# Patient Record
Sex: MALE | Race: WHITE | NOT HISPANIC OR LATINO | URBAN - METROPOLITAN AREA
[De-identification: names, ages, dates, MRNs, and addresses within clinical notes are randomized per-mention and may not be internally consistent; named-entity substitution may affect disease eponyms.]

---

## 2017-05-21 ENCOUNTER — EMERGENCY (EMERGENCY)
Facility: HOSPITAL | Age: 67
LOS: 0 days | Discharge: HOME | End: 2017-05-21
Admitting: INTERNAL MEDICINE

## 2017-06-28 DIAGNOSIS — S81.852A OPEN BITE, LEFT LOWER LEG, INITIAL ENCOUNTER: ICD-10-CM

## 2017-06-28 DIAGNOSIS — Z23 ENCOUNTER FOR IMMUNIZATION: ICD-10-CM
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
## 2021-09-19 ENCOUNTER — TRANSCRIPTION ENCOUNTER (OUTPATIENT)
Age: 71
End: 2021-09-19

## 2022-04-07 ENCOUNTER — TRANSCRIPTION ENCOUNTER (OUTPATIENT)
Age: 72
End: 2022-04-07

## 2022-04-14 ENCOUNTER — INPATIENT (INPATIENT)
Facility: HOSPITAL | Age: 72
LOS: 3 days | Discharge: HOME | End: 2022-04-18
Attending: PSYCHIATRY & NEUROLOGY | Admitting: PSYCHIATRY & NEUROLOGY
Payer: MEDICARE

## 2022-04-14 VITALS — WEIGHT: 274.26 LBS

## 2022-04-14 LAB
ALBUMIN SERPL ELPH-MCNC: 4.2 G/DL — SIGNIFICANT CHANGE UP (ref 3.5–5.2)
ALP SERPL-CCNC: 74 U/L — SIGNIFICANT CHANGE UP (ref 30–115)
ALT FLD-CCNC: 33 U/L — SIGNIFICANT CHANGE UP (ref 0–41)
ANION GAP SERPL CALC-SCNC: 10 MMOL/L — SIGNIFICANT CHANGE UP (ref 7–14)
APTT BLD: 33.3 SEC — SIGNIFICANT CHANGE UP (ref 27–39.2)
AST SERPL-CCNC: 29 U/L — SIGNIFICANT CHANGE UP (ref 0–41)
BASOPHILS # BLD AUTO: 0.02 K/UL — SIGNIFICANT CHANGE UP (ref 0–0.2)
BASOPHILS NFR BLD AUTO: 0.2 % — SIGNIFICANT CHANGE UP (ref 0–1)
BILIRUB SERPL-MCNC: 0.6 MG/DL — SIGNIFICANT CHANGE UP (ref 0.2–1.2)
BLD GP AB SCN SERPL QL: SIGNIFICANT CHANGE UP
BUN SERPL-MCNC: 18 MG/DL — SIGNIFICANT CHANGE UP (ref 10–20)
CALCIUM SERPL-MCNC: 9.1 MG/DL — SIGNIFICANT CHANGE UP (ref 8.5–10.1)
CHLORIDE SERPL-SCNC: 100 MMOL/L — SIGNIFICANT CHANGE UP (ref 98–110)
CO2 SERPL-SCNC: 29 MMOL/L — SIGNIFICANT CHANGE UP (ref 17–32)
CREAT SERPL-MCNC: 0.8 MG/DL — SIGNIFICANT CHANGE UP (ref 0.7–1.5)
EGFR: 96 ML/MIN/1.73M2 — SIGNIFICANT CHANGE UP
EOSINOPHIL # BLD AUTO: 0.1 K/UL — SIGNIFICANT CHANGE UP (ref 0–0.7)
EOSINOPHIL NFR BLD AUTO: 0.9 % — SIGNIFICANT CHANGE UP (ref 0–8)
GLUCOSE SERPL-MCNC: 92 MG/DL — SIGNIFICANT CHANGE UP (ref 70–99)
HCT VFR BLD CALC: 46 % — SIGNIFICANT CHANGE UP (ref 42–52)
HGB BLD-MCNC: 15.7 G/DL — SIGNIFICANT CHANGE UP (ref 14–18)
IMM GRANULOCYTES NFR BLD AUTO: 0.7 % — HIGH (ref 0.1–0.3)
INR BLD: 1.11 RATIO — SIGNIFICANT CHANGE UP (ref 0.65–1.3)
LYMPHOCYTES # BLD AUTO: 18.5 % — LOW (ref 20.5–51.1)
LYMPHOCYTES # BLD AUTO: 2.13 K/UL — SIGNIFICANT CHANGE UP (ref 1.2–3.4)
MCHC RBC-ENTMCNC: 31.2 PG — HIGH (ref 27–31)
MCHC RBC-ENTMCNC: 34.1 G/DL — SIGNIFICANT CHANGE UP (ref 32–37)
MCV RBC AUTO: 91.3 FL — SIGNIFICANT CHANGE UP (ref 80–94)
MONOCYTES # BLD AUTO: 0.94 K/UL — HIGH (ref 0.1–0.6)
MONOCYTES NFR BLD AUTO: 8.1 % — SIGNIFICANT CHANGE UP (ref 1.7–9.3)
NEUTROPHILS # BLD AUTO: 8.27 K/UL — HIGH (ref 1.4–6.5)
NEUTROPHILS NFR BLD AUTO: 71.6 % — SIGNIFICANT CHANGE UP (ref 42.2–75.2)
NRBC # BLD: 0 /100 WBCS — SIGNIFICANT CHANGE UP (ref 0–0)
PLATELET # BLD AUTO: 223 K/UL — SIGNIFICANT CHANGE UP (ref 130–400)
POTASSIUM SERPL-MCNC: 4.2 MMOL/L — SIGNIFICANT CHANGE UP (ref 3.5–5)
POTASSIUM SERPL-SCNC: 4.2 MMOL/L — SIGNIFICANT CHANGE UP (ref 3.5–5)
PROT SERPL-MCNC: 6.8 G/DL — SIGNIFICANT CHANGE UP (ref 6–8)
PROTHROM AB SERPL-ACNC: 12.7 SEC — SIGNIFICANT CHANGE UP (ref 9.95–12.87)
RBC # BLD: 5.04 M/UL — SIGNIFICANT CHANGE UP (ref 4.7–6.1)
RBC # FLD: 13.3 % — SIGNIFICANT CHANGE UP (ref 11.5–14.5)
SARS-COV-2 RNA SPEC QL NAA+PROBE: SIGNIFICANT CHANGE UP
SODIUM SERPL-SCNC: 139 MMOL/L — SIGNIFICANT CHANGE UP (ref 135–146)
TROPONIN T SERPL-MCNC: <0.01 NG/ML — SIGNIFICANT CHANGE UP
WBC # BLD: 11.54 K/UL — HIGH (ref 4.8–10.8)
WBC # FLD AUTO: 11.54 K/UL — HIGH (ref 4.8–10.8)

## 2022-04-14 PROCEDURE — 61645 PERQ ART M-THROMBECT &/NFS: CPT | Mod: LT

## 2022-04-14 PROCEDURE — 99291 CRITICAL CARE FIRST HOUR: CPT

## 2022-04-14 PROCEDURE — 93970 EXTREMITY STUDY: CPT | Mod: 26

## 2022-04-14 PROCEDURE — 70496 CT ANGIOGRAPHY HEAD: CPT | Mod: 26,MA

## 2022-04-14 PROCEDURE — 70450 CT HEAD/BRAIN W/O DYE: CPT | Mod: 26

## 2022-04-14 PROCEDURE — 70498 CT ANGIOGRAPHY NECK: CPT | Mod: 26,MA

## 2022-04-14 PROCEDURE — 0042T: CPT

## 2022-04-14 PROCEDURE — 93010 ELECTROCARDIOGRAM REPORT: CPT

## 2022-04-14 RX ORDER — HEPARIN SODIUM 5000 [USP'U]/ML
5000 INJECTION INTRAVENOUS; SUBCUTANEOUS EVERY 8 HOURS
Refills: 0 | Status: DISCONTINUED | OUTPATIENT
Start: 2022-04-14 | End: 2022-04-15

## 2022-04-14 RX ORDER — SODIUM CHLORIDE 9 MG/ML
1000 INJECTION, SOLUTION INTRAVENOUS
Refills: 0 | Status: DISCONTINUED | OUTPATIENT
Start: 2022-04-14 | End: 2022-04-14

## 2022-04-14 RX ORDER — CHLORHEXIDINE GLUCONATE 213 G/1000ML
1 SOLUTION TOPICAL
Refills: 0 | Status: DISCONTINUED | OUTPATIENT
Start: 2022-04-14 | End: 2022-04-18

## 2022-04-14 RX ORDER — BUDESONIDE AND FORMOTEROL FUMARATE DIHYDRATE 160; 4.5 UG/1; UG/1
2 AEROSOL RESPIRATORY (INHALATION)
Refills: 0 | Status: DISCONTINUED | OUTPATIENT
Start: 2022-04-14 | End: 2022-04-18

## 2022-04-14 RX ORDER — ASPIRIN/CALCIUM CARB/MAGNESIUM 324 MG
81 TABLET ORAL DAILY
Refills: 0 | Status: DISCONTINUED | OUTPATIENT
Start: 2022-04-14 | End: 2022-04-14

## 2022-04-14 RX ORDER — ATORVASTATIN CALCIUM 80 MG/1
80 TABLET, FILM COATED ORAL AT BEDTIME
Refills: 0 | Status: DISCONTINUED | OUTPATIENT
Start: 2022-04-14 | End: 2022-04-18

## 2022-04-14 RX ORDER — IPRATROPIUM/ALBUTEROL SULFATE 18-103MCG
3 AEROSOL WITH ADAPTER (GRAM) INHALATION EVERY 6 HOURS
Refills: 0 | Status: DISCONTINUED | OUTPATIENT
Start: 2022-04-14 | End: 2022-04-18

## 2022-04-14 RX ORDER — ASPIRIN/CALCIUM CARB/MAGNESIUM 324 MG
300 TABLET ORAL DAILY
Refills: 0 | Status: DISCONTINUED | OUTPATIENT
Start: 2022-04-14 | End: 2022-04-15

## 2022-04-14 RX ORDER — SODIUM CHLORIDE 9 MG/ML
1000 INJECTION, SOLUTION INTRAVENOUS
Refills: 0 | Status: DISCONTINUED | OUTPATIENT
Start: 2022-04-14 | End: 2022-04-15

## 2022-04-14 RX ORDER — ACETAMINOPHEN 500 MG
1000 TABLET ORAL ONCE
Refills: 0 | Status: COMPLETED | OUTPATIENT
Start: 2022-04-14 | End: 2022-04-14

## 2022-04-14 RX ORDER — SODIUM CHLORIDE 9 MG/ML
1000 INJECTION INTRAMUSCULAR; INTRAVENOUS; SUBCUTANEOUS
Refills: 0 | Status: DISCONTINUED | OUTPATIENT
Start: 2022-04-14 | End: 2022-04-18

## 2022-04-14 RX ADMIN — Medication 40 MILLIGRAM(S): at 22:18

## 2022-04-14 RX ADMIN — BUDESONIDE AND FORMOTEROL FUMARATE DIHYDRATE 2 PUFF(S): 160; 4.5 AEROSOL RESPIRATORY (INHALATION) at 23:14

## 2022-04-14 RX ADMIN — SODIUM CHLORIDE 75 MILLILITER(S): 9 INJECTION INTRAMUSCULAR; INTRAVENOUS; SUBCUTANEOUS at 22:14

## 2022-04-14 RX ADMIN — HEPARIN SODIUM 5000 UNIT(S): 5000 INJECTION INTRAVENOUS; SUBCUTANEOUS at 22:19

## 2022-04-14 RX ADMIN — Medication 3 MILLILITER(S): at 22:02

## 2022-04-14 RX ADMIN — CHLORHEXIDINE GLUCONATE 1 APPLICATION(S): 213 SOLUTION TOPICAL at 22:20

## 2022-04-14 RX ADMIN — SODIUM CHLORIDE 1000 MILLILITER(S): 9 INJECTION, SOLUTION INTRAVENOUS at 22:44

## 2022-04-14 NOTE — H&P ADULT - ASSESSMENT
70 y/o male with COPD, HTN and VICKI on CPAP was brought for evaluation of confusion,    #CVA with Left MCA infarction s/p thrombectomy  - neurocheck q1h, NICU monitor, bedrest, check echo, EKG, lipid panel, A1c   - heparin sq, repeat CTh stable, will do ASA and Lipitor  - repeat CTH in AM  - Neuroendovascular f/u  - SBP goal 180-140    #HTN: - SBP goal 180-140    #COPD:  - symbicort and albuterol  - one dose of methylprednisone     #VICKI: on CPAP at night  - hold CPAP given RS secretion    ##Diet:NPO, speech eval  #DVT pro: heparin sq  #GI pro: not indicated for now  #Dispo: Neuro CU     70 y/o male with COPD, HTN and VICKI on CPAP was brought for evaluation of confusion,    #CVA with Left MCA infarction s/p thrombectomy  - neurocheck q1h, NICU monitor, bedrest, check echo, EKG, lipid panel, A1c   - heparin sq, repeat CTh stable, will do ASA and Lipitor  - repeat CTH in AM  - Neuroendovascular f/u  - SBP goal 180-140  - given distal DVT will check bubble study for possible PFO    #HTN: - SBP goal 180-140    #distal DVT: left post tibial  - heparin sq for now,     #COPD:  - symbicort and albuterol  - one dose of methylprednisone     #VICKI: on CPAP at night  - hold CPAP given RS secretion    ##Diet:NPO, speech eval  #DVT pro: heparin sq  #GI pro: not indicated for now  #Dispo: Neuro CU     70 y/o male with COPD, HTN and VICKI on CPAP was brought for evaluation of confusion,    #CVA with Left MCA infarction s/p thrombectomy  - neurocheck q1h, NICU monitor, bedrest, check echo, EKG, lipid panel, A1c   - heparin sq, repeat CTh stable, will do ASA and Lipitor  - repeat CTH in AM  - Neuroendovascular f/u  - SBP goal 180-140  - given distal DVT will check bubble study for possible PFO    #HTN: - SBP goal 180-140    #distal DVT: Right post tibial  - heparin sq for now,     #COPD:  - symbicort and albuterol  - one dose of methylprednisone     #VICKI: on CPAP at night  - hold CPAP given RS secretion    ##Diet:NPO, speech eval  #DVT pro: heparin sq  #GI pro: not indicated for now  #Dispo: Neuro CU

## 2022-04-14 NOTE — H&P ADULT - HISTORY OF PRESENT ILLNESS
70 y/o male with COPD, HTN and VICKI on CPAP was brought for evaluation of confusion,    the pt was doing well today, he dropped his GF to her work, later after he arrived home he felt headache, took motrin with no improvement,   at 3pm, he drove and  his GF from the wrok and told her he has headache and not feeling well, after 30 min he was not able  to remember things including his GF name, then he had truobkle finding words and was making non comprehnsive talk,     pt not on antiplatelets or blood thinner, no hx of fever, previous CVA, or CAD, no cough, CP, recent travel, urianry symptoms, seizure or syncope.  in ER [facility], stroke code called for confusion, aphasia and dysarthria, CTH negative but CT Angio +ve for Abrupt occlusion in the inferior terminal branch of the left M2 MCA  and CTP +ve for Perfusion imaging predicts a core infarct of 7 mL in the left temporal   lobe within the left MCA territory.  pt was out of window for TPA, but sent to [place] for thrombectomy, s/p  left MCA M2 branch, per NE there's distal clots at M3 not able to be reached, pt was intubated during procedure  and successfully extubated, currently pt follows simple commands, more awake and alert, still have mild dysarthria and aphasia

## 2022-04-14 NOTE — ED ADULT NURSE REASSESSMENT NOTE - NS ED NURSE REASSESS COMMENT FT1
patient unable to complete dysphagia screen at this time as EMS here to transfer patient to [facility]

## 2022-04-14 NOTE — ED PROVIDER NOTE - CRITICAL CARE ATTENDING CONTRIBUTION TO CARE
71-year-old male with a past medical history significant for VICKI hypertension who presents with confusion.  As per girlfriend patient drove her to work approximately 10 AM and was acting like his usual self.  Patient did complain of a headache to girlfriend but had no other symptoms.  When girlfriend found patient at home at approximately 330 patient was more confused and aphasic.  Patient presented to the ED was called as a stroke notification.  And also has an N I alert and actual.  Spoke to Dr. SITA Pierre stroke attending, and Dr. Thayer, ED attending.  Both informed of the left MCA occlusion.  Patient transferred to the [facility] for neuro intervention.  Family and patient aware. NIH score 5.     VITAL SIGNS: I have reviewed nursing notes and confirm.  CONSTITUTIONAL: non-toxic, well appearing  SKIN: no rash, no petechiae.  EYES: EOMI, pink conjunctiva, anicteric  ENT: tongue midline, no exudates, MMM  NECK: Supple; no meningismus, no JVD  CARD: RRR, no murmurs, equal radial pulses bilaterally 2+  RESP: CTAB, no respiratory distress  ABD: Soft, non-tender, non-distended, no peritoneal signs, no HSM, no CVA tenderness  EXT: Normal ROM x4. No edema. No calves tenderness  NEURO: Alert, oriented x1 (person), pt aphasic. not following all commands.     a/p  71-year-old male who presents for altered mental status called as a stroke code, and I actually know an NI alert.  Telestroke, neuro intervention, ED attending all aware of case.  Patient to go to [place] for evaluation.  Patient also going for neuro intervention.

## 2022-04-14 NOTE — ED ADULT NURSE NOTE - NSINTERVENTIONOPT_GEN_ALL_ED
[FreeTextEntry1] : 73-year-old man originally from [facility] who had 2 stents placed about 3 years ago. Stretcher Alarms/[place]our[place] Rounding

## 2022-04-14 NOTE — ED PROVIDER NOTE - OBJECTIVE STATEMENT
70 y/o male with hx of VICKI, HTN presents to the ED for evaluation of confusion . As per girlfriend, patient drove her to work this morning at 10am , went shopping to [place] and then developed a headache for which he took motrin . patient developed confusion and aphasia. patient able to ambulate. patient not on anticoagulation

## 2022-04-14 NOTE — ED ADULT NURSE REASSESSMENT NOTE - NS ED NURSE REASSESS COMMENT FT1
EMS transport here to transfer patient to [place] ED, patient currently leaving for [place] ED at this time EMS transport here to transfer patient to [place] ED, patient currently leaving for [place] ED at this time, report given to charge Noemi Thao in ED

## 2022-04-14 NOTE — PATIENT PROFILE ADULT - FALL HARM RISK - HARM RISK INTERVENTIONS
Assistance with ambulation/Assistance OOB with selected safe patient handling equipment/Communicate Risk of Fall with Harm to all staff/Discuss with provider need for PT consult/Monitor gait and stability/Provide patient with walking aids - walker, cane, crutches/Reinforce activity limits and safety measures with patient and family/Sit up slowly, dangle for a short time, stand at bedside before walking/Tailored Fall Risk Interventions/Use of alarms - bed, chair and/or voice tab/Visual Cue: Yellow wristband and red socks/Bed in lowest position, wheels locked, appropriate side rails in place/Call bell, personal items and telephone in reach/Instruct patient to call for assistance before getting out of bed or chair/Non-slip footwear when patient is out of bed/[place] to call system/Physically safe environment - no spills, clutter or unnecessary equipment/Purposeful Proactive Rounding/Room/bathroom lighting operational, light cord in reach

## 2022-04-14 NOTE — H&P ADULT - NSHPPHYSICALEXAM_GEN_ALL_CORE
CONSTITUTIONAL: No acute distress, well-developed, well-groomed, AAOx3  HEAD: Atraumatic, normocephalic  EYES: EOM intact, PERRLA, conjunctiva and sclera clear  ENT: pt has secretions, Supple, no masses, no thyromegaly, no bruits, no JVD; moist mucous membranes  PULMONARY: Clear to auscultation bilaterally; no wheezes, rales, or rhonchi  CARDIOVASCULAR: Regular rate and rhythm; no murmurs, rubs, or gallops  GASTROINTESTINAL: Soft, non-tender, non-distended; bowel sounds present, right groin access, no bleeding  MUSCULOSKELETAL: 2+ peripheral pulses; no clubbing, no cyanosis, no edema  NEUROLOGY: alert, awake, oriented x3, knows his name, can tell we are in [place] and tells his , moves all ext, sensation itnact in all ext, but has mild aphasia and dysarthria,   SKIN: No rashes or lesions; warm and dry

## 2022-04-14 NOTE — ED PROVIDER NOTE - CLINICAL SUMMARY MEDICAL DECISION MAKING FREE TEXT BOX
Patient received as emergent transfer from [facility] for stroke with large vessel occlusion.  Full work-up reviewed including CT angio.  Case discussed with interventional radiology, neurology, neuro critical care.  Vital signs reviewed.  Dr. Stoddard spoken to in IR suite pending patient arrival.  Will admit to neuro critical care via IR suite for clot retrieval.

## 2022-04-14 NOTE — CHART NOTE - NSCHARTNOTEFT_GEN_A_CORE
PACU ANESTHESIA ADMISSION NOTE      Procedure:   Post op diagnosis:      ____  Intubated  TV:______       Rate: ______      FiO2: ______    _x___  Patent Airway    __x__  Full return of protective reflexes    __x__  Full recovery from anesthesia / back to baseline     Vitals:   T:  36         R: 20                 BP:    140/70              Sat: 97                 P: 52      Mental Status:  __x__ Awake   _____ Alert   _____ Drowsy   _____ Sedated    Nausea/Vomiting:  __x__ NO  ______Yes,   See Post - Op Orders          Pain Scale (0-10):  ___0__    Treatment: ____ None    ____ See Post - Op/PCA Orders    Post - Operative Fluids:   ____ Oral   ____ See Post - Op Orders    Plan: Discharge:   ____Home       _____Floor     __x___Critical Care    _____  Other:_________________    Comments:

## 2022-04-14 NOTE — BRIEF OPERATIVE NOTE - OPERATION/FINDINGS
Procedure : Diagnostic cerebral angiogram     Amount and type of contrast : Visipaque 320   Medications given during procedure: n/a  Implants placed: n/a  Complications: n/a     Post-procedure exam:   NIHSS 5  Extremity RLE groin site CDI without evidence of hematoma formation, swelling, warmth, oozing at the time of closure. Distal pulses intact to palpation post procedure.   Abd NTND     Suggestions :   Patient is to be admitted to neuro ICU post procedure,   Please follow post NI orders for neuro checks, distal pulses, vitals, and groin checks placed for total of 24 hour recovery period following procedure. Please keep reverse trendelenberg, knee immobilizer, bed rest x 6 hr.   SBP <160.    Please notify provider with any signs of bleeding or hematoma at R groin site, change in mental status, vitals outside parameters, or absent distal pulses.   Management per neuro ICU.   [phone]

## 2022-04-14 NOTE — CHART NOTE - NSCHARTNOTEFT_GEN_A_CORE
Patient presented to [facility] 10:00 4/14/22 presenting with confusion and trouble speaking, stroke code activated, NIHSS 5 for aphasia, dysarthria, LOC questions. CTH with age indeterminant infarct in right hemisphere, CTP with 7ml core infarct, 1.9 mismatch volume in the L MCA territory. Code NI actual activated patient to be transferred to [facility] for neuroendovascular mechanical thrombectomy.     Patient's brother's number in chart out of service attempting to contact patient's girlfriend for further appropriate relations to provide consent, if none available 2 PC will be obtained due to emergent nature of procedure.     Dr. Castellon notified.

## 2022-04-14 NOTE — H&P ADULT - NSHPLABSRESULTS_GEN_ALL_CORE
LABS:                        15.7   11.54 )-----------( 223      ( 14 Apr 2022 17:15 )             46.0     14 Apr 2022 17:15    139    |  100    |  18     ----------------------------<  92     4.2     |  29     |  0.8      Ca    9.1        14 Apr 2022 17:15    TPro  6.8    /  Alb  4.2    /  TBili  0.6    /  DBili  x      /  AST  29     /  ALT  33     /  AlkPhos  74     14 Apr 2022 17:15    PT/INR - ( 14 Apr 2022 17:15 )   PT: 12.70 sec;   INR: 1.11 ratio         PTT - ( 14 Apr 2022 17:15 )  PTT:33.3 sec  < from: CT Angio Neck w/ IV Cont (04.14.22 @ 17:17) >    IMPRESSION:    CT PERFUSION:  Perfusion imaging predicts a core infarct of 7 mL in the left temporal   lobe within the left MCA territory. Based on the perfusion mismatch,   there is a predicted volume of 30 mL penumbra of tissue at risk. Mismatch   ratio of 5.3.    CTA HEAD/NECK:  Abrupt occlusion in the inferior terminal branch of the left M2 MCA. The   remaining MCA branches are patent.    Communication: The summary of above findings were discussed with readback   confirmation with ANIA Thorne by radiologist Dr. Zuniga on 4/14/2022 at 5:33   PM.    --- End of Report ---    < end of copied text >    < from: CT Head No Cont (04.14.22 @ 20:39) >      IMPRESSION:    Status post thrombectomy of left M2 MCA occlusion. No detectable acute   territorial infarct, intracranial hemorrhage, or midline shift.    --- End of Report ---

## 2022-04-14 NOTE — ED PROVIDER NOTE - PROGRESS NOTE DETAILS
stroke code immediately called. patient to CT. spoke with Dr. Teague who advises patient as NI and transferrred to [facility]. telestroke eval patient. patient family aware of situation and transferred to [facility]

## 2022-04-15 LAB
ALBUMIN SERPL ELPH-MCNC: 3.4 G/DL — LOW (ref 3.5–5.2)
ALBUMIN SERPL ELPH-MCNC: 3.4 G/DL — LOW (ref 3.5–5.2)
ALP SERPL-CCNC: 65 U/L — SIGNIFICANT CHANGE UP (ref 30–115)
ALP SERPL-CCNC: 68 U/L — SIGNIFICANT CHANGE UP (ref 30–115)
ALT FLD-CCNC: 28 U/L — SIGNIFICANT CHANGE UP (ref 0–41)
ALT FLD-CCNC: 28 U/L — SIGNIFICANT CHANGE UP (ref 0–41)
ANION GAP SERPL CALC-SCNC: 10 MMOL/L — SIGNIFICANT CHANGE UP (ref 7–14)
ANION GAP SERPL CALC-SCNC: 11 MMOL/L — SIGNIFICANT CHANGE UP (ref 7–14)
APTT BLD: 28.2 SEC — SIGNIFICANT CHANGE UP (ref 27–39.2)
AST SERPL-CCNC: 21 U/L — SIGNIFICANT CHANGE UP (ref 0–41)
AST SERPL-CCNC: 25 U/L — SIGNIFICANT CHANGE UP (ref 0–41)
BASOPHILS # BLD AUTO: 0.01 K/UL — SIGNIFICANT CHANGE UP (ref 0–0.2)
BASOPHILS # BLD AUTO: 0.03 K/UL — SIGNIFICANT CHANGE UP (ref 0–0.2)
BASOPHILS NFR BLD AUTO: 0.1 % — SIGNIFICANT CHANGE UP (ref 0–1)
BASOPHILS NFR BLD AUTO: 0.3 % — SIGNIFICANT CHANGE UP (ref 0–1)
BILIRUB DIRECT SERPL-MCNC: 0.2 MG/DL — SIGNIFICANT CHANGE UP (ref 0–0.3)
BILIRUB INDIRECT FLD-MCNC: 0.3 MG/DL — SIGNIFICANT CHANGE UP (ref 0.2–1.2)
BILIRUB SERPL-MCNC: 0.5 MG/DL — SIGNIFICANT CHANGE UP (ref 0.2–1.2)
BILIRUB SERPL-MCNC: 0.5 MG/DL — SIGNIFICANT CHANGE UP (ref 0.2–1.2)
BUN SERPL-MCNC: 15 MG/DL — SIGNIFICANT CHANGE UP (ref 10–20)
BUN SERPL-MCNC: 16 MG/DL — SIGNIFICANT CHANGE UP (ref 10–20)
CALCIUM SERPL-MCNC: 8.1 MG/DL — LOW (ref 8.5–10.1)
CALCIUM SERPL-MCNC: 8.5 MG/DL — SIGNIFICANT CHANGE UP (ref 8.5–10.1)
CHLORIDE SERPL-SCNC: 101 MMOL/L — SIGNIFICANT CHANGE UP (ref 98–110)
CHLORIDE SERPL-SCNC: 103 MMOL/L — SIGNIFICANT CHANGE UP (ref 98–110)
CK SERPL-CCNC: 247 U/L — HIGH (ref 0–225)
CO2 SERPL-SCNC: 24 MMOL/L — SIGNIFICANT CHANGE UP (ref 17–32)
CO2 SERPL-SCNC: 25 MMOL/L — SIGNIFICANT CHANGE UP (ref 17–32)
CREAT SERPL-MCNC: 0.7 MG/DL — SIGNIFICANT CHANGE UP (ref 0.7–1.5)
CREAT SERPL-MCNC: 0.8 MG/DL — SIGNIFICANT CHANGE UP (ref 0.7–1.5)
EGFR: 100 ML/MIN/1.73M2 — SIGNIFICANT CHANGE UP
EGFR: 96 ML/MIN/1.73M2 — SIGNIFICANT CHANGE UP
EOSINOPHIL # BLD AUTO: 0 K/UL — SIGNIFICANT CHANGE UP (ref 0–0.7)
EOSINOPHIL # BLD AUTO: 0.04 K/UL — SIGNIFICANT CHANGE UP (ref 0–0.7)
EOSINOPHIL NFR BLD AUTO: 0 % — SIGNIFICANT CHANGE UP (ref 0–8)
EOSINOPHIL NFR BLD AUTO: 0.4 % — SIGNIFICANT CHANGE UP (ref 0–8)
FIBRINOGEN PPP-MCNC: 429 MG/DL — SIGNIFICANT CHANGE UP (ref 204.4–570.6)
GLUCOSE SERPL-MCNC: 110 MG/DL — HIGH (ref 70–99)
GLUCOSE SERPL-MCNC: 149 MG/DL — HIGH (ref 70–99)
HCT VFR BLD CALC: 40 % — LOW (ref 42–52)
HCT VFR BLD CALC: 41.1 % — LOW (ref 42–52)
HCV AB S/CO SERPL IA: 50.89 COI — HIGH
HCV AB SERPL-IMP: REACTIVE
HGB BLD-MCNC: 13.4 G/DL — LOW (ref 14–18)
HGB BLD-MCNC: 13.4 G/DL — LOW (ref 14–18)
IMM GRANULOCYTES NFR BLD AUTO: 0.6 % — HIGH (ref 0.1–0.3)
IMM GRANULOCYTES NFR BLD AUTO: 0.6 % — HIGH (ref 0.1–0.3)
INR BLD: 1.17 RATIO — SIGNIFICANT CHANGE UP (ref 0.65–1.3)
LYMPHOCYTES # BLD AUTO: 0.87 K/UL — LOW (ref 1.2–3.4)
LYMPHOCYTES # BLD AUTO: 1.34 K/UL — SIGNIFICANT CHANGE UP (ref 1.2–3.4)
LYMPHOCYTES # BLD AUTO: 12.3 % — LOW (ref 20.5–51.1)
LYMPHOCYTES # BLD AUTO: 7.6 % — LOW (ref 20.5–51.1)
MAGNESIUM SERPL-MCNC: 2 MG/DL — SIGNIFICANT CHANGE UP (ref 1.8–2.4)
MCHC RBC-ENTMCNC: 29.9 PG — SIGNIFICANT CHANGE UP (ref 27–31)
MCHC RBC-ENTMCNC: 30.5 PG — SIGNIFICANT CHANGE UP (ref 27–31)
MCHC RBC-ENTMCNC: 32.6 G/DL — SIGNIFICANT CHANGE UP (ref 32–37)
MCHC RBC-ENTMCNC: 33.5 G/DL — SIGNIFICANT CHANGE UP (ref 32–37)
MCV RBC AUTO: 91.1 FL — SIGNIFICANT CHANGE UP (ref 80–94)
MCV RBC AUTO: 91.7 FL — SIGNIFICANT CHANGE UP (ref 80–94)
MONOCYTES # BLD AUTO: 0.25 K/UL — SIGNIFICANT CHANGE UP (ref 0.1–0.6)
MONOCYTES # BLD AUTO: 0.72 K/UL — HIGH (ref 0.1–0.6)
MONOCYTES NFR BLD AUTO: 2.2 % — SIGNIFICANT CHANGE UP (ref 1.7–9.3)
MONOCYTES NFR BLD AUTO: 6.6 % — SIGNIFICANT CHANGE UP (ref 1.7–9.3)
NEUTROPHILS # BLD AUTO: 10.23 K/UL — HIGH (ref 1.4–6.5)
NEUTROPHILS # BLD AUTO: 8.73 K/UL — HIGH (ref 1.4–6.5)
NEUTROPHILS NFR BLD AUTO: 79.8 % — HIGH (ref 42.2–75.2)
NEUTROPHILS NFR BLD AUTO: 89.5 % — HIGH (ref 42.2–75.2)
NRBC # BLD: 0 /100 WBCS — SIGNIFICANT CHANGE UP (ref 0–0)
NRBC # BLD: 0 /100 WBCS — SIGNIFICANT CHANGE UP (ref 0–0)
PHOSPHATE SERPL-MCNC: 3.1 MG/DL — SIGNIFICANT CHANGE UP (ref 2.1–4.9)
PLATELET # BLD AUTO: 196 K/UL — SIGNIFICANT CHANGE UP (ref 130–400)
PLATELET # BLD AUTO: 230 K/UL — SIGNIFICANT CHANGE UP (ref 130–400)
POTASSIUM SERPL-MCNC: 3.9 MMOL/L — SIGNIFICANT CHANGE UP (ref 3.5–5)
POTASSIUM SERPL-MCNC: 4.7 MMOL/L — SIGNIFICANT CHANGE UP (ref 3.5–5)
POTASSIUM SERPL-SCNC: 3.9 MMOL/L — SIGNIFICANT CHANGE UP (ref 3.5–5)
POTASSIUM SERPL-SCNC: 4.7 MMOL/L — SIGNIFICANT CHANGE UP (ref 3.5–5)
PROT SERPL-MCNC: 5.7 G/DL — LOW (ref 6–8)
PROT SERPL-MCNC: 6.1 G/DL — SIGNIFICANT CHANGE UP (ref 6–8)
PROTHROM AB SERPL-ACNC: 13.4 SEC — HIGH (ref 9.95–12.87)
RBC # BLD: 4.39 M/UL — LOW (ref 4.7–6.1)
RBC # BLD: 4.48 M/UL — LOW (ref 4.7–6.1)
RBC # FLD: 13.3 % — SIGNIFICANT CHANGE UP (ref 11.5–14.5)
RBC # FLD: 13.4 % — SIGNIFICANT CHANGE UP (ref 11.5–14.5)
SODIUM SERPL-SCNC: 137 MMOL/L — SIGNIFICANT CHANGE UP (ref 135–146)
SODIUM SERPL-SCNC: 137 MMOL/L — SIGNIFICANT CHANGE UP (ref 135–146)
T4 FREE SERPL-MCNC: 1.2 NG/DL — SIGNIFICANT CHANGE UP (ref 0.9–1.8)
TROPONIN T SERPL-MCNC: <0.01 NG/ML — SIGNIFICANT CHANGE UP
TSH SERPL-MCNC: 0.94 UIU/ML — SIGNIFICANT CHANGE UP (ref 0.27–4.2)
WBC # BLD: 10.93 K/UL — HIGH (ref 4.8–10.8)
WBC # BLD: 11.43 K/UL — HIGH (ref 4.8–10.8)
WBC # FLD AUTO: 10.93 K/UL — HIGH (ref 4.8–10.8)
WBC # FLD AUTO: 11.43 K/UL — HIGH (ref 4.8–10.8)

## 2022-04-15 PROCEDURE — 70450 CT HEAD/BRAIN W/O DYE: CPT | Mod: 26

## 2022-04-15 PROCEDURE — 93306 TTE W/DOPPLER COMPLETE: CPT | Mod: 26

## 2022-04-15 PROCEDURE — 71045 X-RAY EXAM CHEST 1 VIEW: CPT | Mod: 26

## 2022-04-15 PROCEDURE — 93010 ELECTROCARDIOGRAM REPORT: CPT | Mod: 77

## 2022-04-15 PROCEDURE — 99291 CRITICAL CARE FIRST HOUR: CPT

## 2022-04-15 PROCEDURE — 93010 ELECTROCARDIOGRAM REPORT: CPT

## 2022-04-15 RX ORDER — FAMOTIDINE 10 MG/ML
20 INJECTION INTRAVENOUS DAILY
Refills: 0 | Status: DISCONTINUED | OUTPATIENT
Start: 2022-04-15 | End: 2022-04-18

## 2022-04-15 RX ORDER — MIDODRINE HYDROCHLORIDE 2.5 MG/1
10 TABLET ORAL THREE TIMES A DAY
Refills: 0 | Status: COMPLETED | OUTPATIENT
Start: 2022-04-15 | End: 2022-04-15

## 2022-04-15 RX ORDER — SODIUM CHLORIDE 9 MG/ML
1000 INJECTION INTRAMUSCULAR; INTRAVENOUS; SUBCUTANEOUS ONCE
Refills: 0 | Status: COMPLETED | OUTPATIENT
Start: 2022-04-15 | End: 2022-04-15

## 2022-04-15 RX ORDER — HEPARIN SODIUM 5000 [USP'U]/ML
1400 INJECTION INTRAVENOUS; SUBCUTANEOUS
Qty: 25000 | Refills: 0 | Status: DISCONTINUED | OUTPATIENT
Start: 2022-04-15 | End: 2022-04-16

## 2022-04-15 RX ORDER — ASPIRIN/CALCIUM CARB/MAGNESIUM 324 MG
81 TABLET ORAL DAILY
Refills: 0 | Status: DISCONTINUED | OUTPATIENT
Start: 2022-04-15 | End: 2022-04-16

## 2022-04-15 RX ORDER — ACETAMINOPHEN 500 MG
650 TABLET ORAL EVERY 6 HOURS
Refills: 0 | Status: DISCONTINUED | OUTPATIENT
Start: 2022-04-15 | End: 2022-04-18

## 2022-04-15 RX ADMIN — HEPARIN SODIUM 5000 UNIT(S): 5000 INJECTION INTRAVENOUS; SUBCUTANEOUS at 15:39

## 2022-04-15 RX ADMIN — Medication 3 MILLILITER(S): at 14:17

## 2022-04-15 RX ADMIN — Medication 3 MILLILITER(S): at 20:05

## 2022-04-15 RX ADMIN — Medication 81 MILLIGRAM(S): at 11:22

## 2022-04-15 RX ADMIN — Medication 3 MILLILITER(S): at 07:58

## 2022-04-15 RX ADMIN — Medication 650 MILLIGRAM(S): at 21:44

## 2022-04-15 RX ADMIN — SODIUM CHLORIDE 1000 MILLILITER(S): 9 INJECTION INTRAMUSCULAR; INTRAVENOUS; SUBCUTANEOUS at 06:08

## 2022-04-15 RX ADMIN — HEPARIN SODIUM 5000 UNIT(S): 5000 INJECTION INTRAVENOUS; SUBCUTANEOUS at 05:06

## 2022-04-15 RX ADMIN — CHLORHEXIDINE GLUCONATE 1 APPLICATION(S): 213 SOLUTION TOPICAL at 06:08

## 2022-04-15 RX ADMIN — FAMOTIDINE 20 MILLIGRAM(S): 10 INJECTION INTRAVENOUS at 11:22

## 2022-04-15 RX ADMIN — Medication 1000 MILLIGRAM(S): at 00:19

## 2022-04-15 RX ADMIN — SODIUM CHLORIDE 75 MILLILITER(S): 9 INJECTION INTRAMUSCULAR; INTRAVENOUS; SUBCUTANEOUS at 19:58

## 2022-04-15 RX ADMIN — SODIUM CHLORIDE 500 MILLILITER(S): 9 INJECTION INTRAMUSCULAR; INTRAVENOUS; SUBCUTANEOUS at 08:32

## 2022-04-15 RX ADMIN — ATORVASTATIN CALCIUM 80 MILLIGRAM(S): 80 TABLET, FILM COATED ORAL at 21:43

## 2022-04-15 RX ADMIN — Medication 400 MILLIGRAM(S): at 00:04

## 2022-04-15 RX ADMIN — Medication 81 MILLIGRAM(S): at 01:52

## 2022-04-15 RX ADMIN — Medication 600 MILLIGRAM(S): at 17:21

## 2022-04-15 RX ADMIN — BUDESONIDE AND FORMOTEROL FUMARATE DIHYDRATE 2 PUFF(S): 160; 4.5 AEROSOL RESPIRATORY (INHALATION) at 19:58

## 2022-04-15 RX ADMIN — HEPARIN SODIUM 12 UNIT(S)/HR: 5000 INJECTION INTRAVENOUS; SUBCUTANEOUS at 21:59

## 2022-04-15 RX ADMIN — Medication 650 MILLIGRAM(S): at 22:14

## 2022-04-15 RX ADMIN — MIDODRINE HYDROCHLORIDE 10 MILLIGRAM(S): 2.5 TABLET ORAL at 01:52

## 2022-04-15 RX ADMIN — BUDESONIDE AND FORMOTEROL FUMARATE DIHYDRATE 2 PUFF(S): 160; 4.5 AEROSOL RESPIRATORY (INHALATION) at 08:27

## 2022-04-15 NOTE — SWALLOW BEDSIDE ASSESSMENT ADULT - SLP PERTINENT HISTORY OF CURRENT PROBLEM
Pt admitted with aphasia, AMS. Acute L MCA, s/p mechanical thrombectomy upon arrival to ED. PMHx: COPD

## 2022-04-15 NOTE — CHART NOTE - NSCHARTNOTEFT_GEN_A_CORE
Patient is POD 1 s/p emergent mechanical thrombectomy with the neuroendovascular team / Dr. Castellon.  TICI 3 recanalization of the left MCA achieved with 1 pass.  Immediate post-procedure CTH negative for hemorrhage, acute infarct, or midline shift.     Patient without complaints this AM.     NIHSS 2 for mild aphasia and mild dysarthria.   Right groin site CDI without evidence of bleeding, hematoma formation, oozing, swelling, warmth. Site soft and nontender to palpation.   Distal pulses palpable bilaterally.   Abdomen NTND.     Management per neuro ICU.

## 2022-04-15 NOTE — PROGRESS NOTE ADULT - ASSESSMENT
72 y/o male with COPD, HTN and VICKI on CPAP was brought for evaluation of confusion,    #CVA with Left MCA infarction s/p thrombectomy  - neurocheck q1h, NICU monitor, activity increase as tolerated  - EKG shows atrial fibrillation; once repeat CT scan excludes cerebral hemorrhage, patient will be on therapeutic anticoagulation  - heparin sq, repeat CTh stable, will do ASA and Lipitor  - Neuroendovascular f/u  - SBP goal 180-140  - given distal DVT will check bubble study for possible PFO    #HTN: - SBP goal 180-140    #distal DVT: Right post tibial  - heparin sq for now,     #COPD:  - symbicort and albuterol  - one dose of methylprednisone     #VICKI: on CPAP at night  - will see if we can resume CPAP tonight    ##Diet: regular  #DVT pro: heparin sq  #GI pro: not indicated for now  #Dispo: Neuro CU

## 2022-04-15 NOTE — PROGRESS NOTE ADULT - SUBJECTIVE AND OBJECTIVE BOX
SUBJECTIVE:    Patient is a 71y old Male who presents with a chief complaint of aphasia (15 Apr 2022 05:29)    Overnight Events: No overnight events. Patient underwent thrombectomy of the left MCA yesterday, neurologically intact with a NIH score of 1-2, remains unchanged throughout the day.  S&S cleared patient for regular diet    PAST MEDICAL & SURGICAL HISTORY  VICKI on CPAP      SOCIAL HISTORY:  Negative for smoking/alcohol/drug use.     ALLERGIES:  No Known Allergies    MEDICATIONS:  STANDING MEDICATIONS  albuterol/ipratropium for Nebulization 3 milliLiter(s) Nebulizer every 6 hours  aspirin enteric coated 81 milliGRAM(s) Oral daily  atorvastatin 80 milliGRAM(s) Oral at bedtime  budesonide 160 MICROgram(s)/formoterol 4.5 MICROgram(s) Inhaler 2 Puff(s) Inhalation two times a day  chlorhexidine 4% Liquid 1 Application(s) Topical <User Schedule>  famotidine    Tablet 20 milliGRAM(s) Oral daily  heparin   Injectable 5000 Unit(s) SubCutaneous every 8 hours  sodium chloride 0.9%. 1000 milliLiter(s) IV Continuous <Continuous>    PRN MEDICATIONS    VITALS:   T(F): 96.2, Max: 96.8 (04-15-22 @ 04:00)  HR: 70 (52 - 70)  BP: 138/96 (78/59 - 148/83)  RR: 34 (13 - 36)  SpO2: 95% (94% - 100%)    LABS:                        13.4   11.43 )-----------( 230      ( 15 Apr 2022 04:30 )             41.1     04-15    137  |  101  |  16  ----------------------------<  149<H>  4.7   |  25  |  0.8    Ca    8.5      15 Apr 2022 04:30  Phos  3.1     04-14  Mg     2.0     04-14    TPro  6.1  /  Alb  3.4<L>  /  TBili  0.5  /  DBili  x   /  AST  25  /  ALT  28  /  AlkPhos  68  04-15    PT/INR - ( 14 Apr 2022 22:00 )   PT: 13.40 sec;   INR: 1.17 ratio         PTT - ( 14 Apr 2022 22:00 )  PTT:28.2 sec      Troponin T, Serum: <0.01 ng/mL (04-15-22 @ 06:29)  Creatine Kinase, Serum: 247 U/L *H* (04-14-22 @ 19:12)  Troponin T, Serum: <0.01 ng/mL (04-14-22 @ 17:15)      CARDIAC MARKERS ( 15 Apr 2022 06:29 )  x     / <0.01 ng/mL / x     / x     / x      CARDIAC MARKERS ( 14 Apr 2022 19:12 )  x     / x     / 247 U/L / x     / x      CARDIAC MARKERS ( 14 Apr 2022 17:15 )  x     / <0.01 ng/mL / x     / x     / x              04-14-22 @ 07:01  -  04-15-22 @ 07:00  --------------------------------------------------------  IN: 2850 mL / OUT: 950 mL / NET: 1900 mL    04-15-22 @ 07:01  -  04-15-22 @ 14:45  --------------------------------------------------------  IN: 1495 mL / OUT: 470 mL / NET: 1025 mL          IMAGING/EKG:    PHYSICAL EXAM:  GEN: NAD, comfortable  LUNGS: CTAB, no w/r/r  HEART: RRR, s1 and s2 appreciated, no m/r/g  ABD: soft, NT/ND, +BS  EXT: no edema, PP b/l  NEURO: AAOX3 SUBJECTIVE:    Patient is a 71y old Male who presents with a chief complaint of aphasia (15 Apr 2022 05:29)    Overnight Events: No overnight events. Patient underwent thrombectomy of the left MCA yesterday, neurologically intact with a NIH score of 1-2, remains unchanged throughout the day.  S&S cleared patient for regular diet.    PAST MEDICAL & SURGICAL HISTORY  VICKI on CPAP      SOCIAL HISTORY:  Negative for smoking/alcohol/drug use.     ALLERGIES:  No Known Allergies    MEDICATIONS:  STANDING MEDICATIONS  albuterol/ipratropium for Nebulization 3 milliLiter(s) Nebulizer every 6 hours  aspirin enteric coated 81 milliGRAM(s) Oral daily  atorvastatin 80 milliGRAM(s) Oral at bedtime  budesonide 160 MICROgram(s)/formoterol 4.5 MICROgram(s) Inhaler 2 Puff(s) Inhalation two times a day  chlorhexidine 4% Liquid 1 Application(s) Topical <User Schedule>  famotidine    Tablet 20 milliGRAM(s) Oral daily  heparin   Injectable 5000 Unit(s) SubCutaneous every 8 hours  sodium chloride 0.9%. 1000 milliLiter(s) IV Continuous <Continuous>    PRN MEDICATIONS    VITALS:   T(F): 96.2, Max: 96.8 (04-15-22 @ 04:00)  HR: 70 (52 - 70)  BP: 138/96 (78/59 - 148/83)  RR: 34 (13 - 36)  SpO2: 95% (94% - 100%)    LABS:                        13.4   11.43 )-----------( 230      ( 15 Apr 2022 04:30 )             41.1     04-15    137  |  101  |  16  ----------------------------<  149<H>  4.7   |  25  |  0.8    Ca    8.5      15 Apr 2022 04:30  Phos  3.1     04-14  Mg     2.0     04-14    TPro  6.1  /  Alb  3.4<L>  /  TBili  0.5  /  DBili  x   /  AST  25  /  ALT  28  /  AlkPhos  68  04-15    PT/INR - ( 14 Apr 2022 22:00 )   PT: 13.40 sec;   INR: 1.17 ratio         PTT - ( 14 Apr 2022 22:00 )  PTT:28.2 sec      Troponin T, Serum: <0.01 ng/mL (04-15-22 @ 06:29)  Creatine Kinase, Serum: 247 U/L *H* (04-14-22 @ 19:12)  Troponin T, Serum: <0.01 ng/mL (04-14-22 @ 17:15)      CARDIAC MARKERS ( 15 Apr 2022 06:29 )  x     / <0.01 ng/mL / x     / x     / x      CARDIAC MARKERS ( 14 Apr 2022 19:12 )  x     / x     / 247 U/L / x     / x      CARDIAC MARKERS ( 14 Apr 2022 17:15 )  x     / <0.01 ng/mL / x     / x     / x              04-14-22 @ 07:01  -  04-15-22 @ 07:00  --------------------------------------------------------  IN: 2850 mL / OUT: 950 mL / NET: 1900 mL    04-15-22 @ 07:01  -  04-15-22 @ 14:45  --------------------------------------------------------  IN: 1495 mL / OUT: 470 mL / NET: 1025 mL          IMAGING/EKG:    MRI pending for today    PHYSICAL EXAM:  GEN: NAD, comfortable  LUNGS: CTAB, no w/r/r  HEART: RRR, s1 and s2 appreciated, no m/r/g  ABD: soft, NT/ND, +BS  EXT: no edema, PP b/l  NEURO: AAOX3, NIH score= 1-2 throughout the day

## 2022-04-15 NOTE — CHART NOTE - NSCHARTNOTEFT_GEN_A_CORE
heart Rhythm  looks abnormal on the monitor, EKG showed Afib (new onset) with slow vent response,  will consult cardiology, pt will need to be on AC, needs repeat CTH before resuming AC,   his BP after procedure has been in lower side SBP , WITH map 65-72  pt is asymptomatic, s/p 1 Liter bolus, will cont to monitor heart Rhythm  looks abnormal on the monitor, EKG showed Afib (new onset) with slow vent response,  will consult EP, pt will need to be on AC, needs repeat CTH before resuming AC,   his BP after procedure has been in lower side SBP , WITH map 65-72  pt is asymptomatic, s/p 1 Liter bolus, will cont to monitor heart Rhythm  looks abnormal on the monitor, EKG showed Afib (new onset) with slow vent response,  will consult EP, pt will need to be on AC, needs repeat CTH before resuming AC,   his BP after procedure has been in lower side SBP , WITH map 65-72  pt is asymptomatic, s/p 1 Liter bolus, probably from sedation, will cont to monitor

## 2022-04-15 NOTE — CONSULT NOTE ADULT - ASSESSMENT
72 y/o male with COPD, HTN and VICKI on CPAP was brought for evaluation of confusion. [place], stroke code called for confusion, aphasia and dysarthria, CTH negative but CT Angio +ve for Abrupt occlusion in the inferior terminal branch of the left M2 MCA  and CTP +ve for Perfusion imaging predicts a core infarct of 7 mL in the left temporal   lobe within the left MCA territory.  pt was out of window for TPA, but sent to [place] for thrombectomy, s/p  left MCA M2 branch, per NE there's distal clots at M3 not able to be reached, pt was intubated during procedure  and successfully extubated, currently pt follows simple commands, more awake and alert, still have mild dysarthria and aphasia. patient was found to have Afib with slow heart rate and PVCs on the monitor. EPS consulted for further care.     Impression   -Left MCA CVA s/p thrombectomy  -New onset Afib, CHADSVASC of 4  -Right distal DVT  -HTN  -COPD/ VICKI on CPAP        Recommendations   -cont with telemetry monitor  -TSH  -HBA1C   -2d echo  -cont with aspirin  -will need to be on AC once cleared by interventional neurology  -avoid AV node blocking agents for now   -will follow

## 2022-04-15 NOTE — CONSULT NOTE ADULT - SUBJECTIVE AND OBJECTIVE BOX
HPI:  72 y/o male with COPD, HTN and VICKI on CPAP was brought for evaluation of confusion,  the pt was doing well today, he dropped his GF to her work, later after he arrived home he felt headache, took motrin with no improvement,   at 3pm, he drove and  his GF from the wrok and told her he has headache and not feeling well, after 30 min he was not able  to remember things including his GF name, then he had truobkle finding words and was making non comprehnsive talk,   pt not on antiplatelets or blood thinner, no hx of fever, previous CVA, or CAD, no cough, CP, recent travel, urianry symptoms, seizure or syncope.  in ER [facility], stroke code called for confusion, aphasia and dysarthria, CTH negative but CT Angio +ve for Abrupt occlusion in the inferior terminal branch of the left M2 MCA  and CTP +ve for Perfusion imaging predicts a core infarct of 7 mL in the left temporal   lobe within the left MCA territory.  pt was out of window for TPA, but sent to [place] for thrombectomy, s/p  left MCA M2 branch, per NE there's distal clots at M3 not able to be reached, pt was intubated during procedure  and successfully extubated, currently pt follows simple commands, more awake and alert, still have mild dysarthria and aphasia    (14 Apr 2022 21:25)    EPS addendum   patient was found to have Afib with slow heart rate and PVCs on the monitor. EPS consulted for further care.       PAST MEDICAL & SURGICAL HISTORY  VICKI on CPAP        FAMILY HISTORY:  FAMILY HISTORY:      SOCIAL HISTORY:  []smoker  []Alcohol  []Drug    ALLERGIES:  No Known Allergies      MEDICATIONS:  MEDICATIONS  (STANDING):  albuterol/ipratropium for Nebulization 3 milliLiter(s) Nebulizer every 6 hours  aspirin enteric coated 81 milliGRAM(s) Oral daily  atorvastatin 80 milliGRAM(s) Oral at bedtime  budesonide 160 MICROgram(s)/formoterol 4.5 MICROgram(s) Inhaler 2 Puff(s) Inhalation two times a day  chlorhexidine 4% Liquid 1 Application(s) Topical <User Schedule>  heparin   Injectable 5000 Unit(s) SubCutaneous every 8 hours  lactated ringers. 1000 milliLiter(s) (1000 mL/Hr) IV Continuous <Continuous>  sodium chloride 0.9%. 1000 milliLiter(s) (75 mL/Hr) IV Continuous <Continuous>    MEDICATIONS  (PRN):      HOME MEDICATIONS:  Home Medications:  Symbicort 160 mcg-4.5 mcg/inh inhalation aerosol: 2 puff(s) inhaled 2 times a day (14 Apr 2022 21:25)      VITALS:   T(F): 96.8 (04-15 @ 04:00), Max: 96.8 (04-15 @ 04:00)  HR: 58 (04-15 @ 05:00) (52 - 70)  BP: 83/66 (04-15 @ 05:00) (78/59 - 148/83)  BP(mean): 71 (04-15 @ 05:00) (63 - 101)  RR: 15 (04-15 @ 05:00) (13 - 36)  SpO2: 94% (04-15 @ 05:00) (94% - 100%)    I&O's Summary    14 Apr 2022 07:01  -  15 Apr 2022 05:29  --------------------------------------------------------  IN: 1700 mL / OUT: 725 mL / NET: 975 mL        REVIEW OF SYSTEMS:  unable to assess due to aphasia     PHYSICAL EXAM:  NEURO: patient is awake , alert and oriented with mild confusion  GEN: Not in acute distress  NECK: no thyroid enlargement, no JVD  LUNGS: Clear to auscultation bilaterally   CARDIOVASCULAR: S1/S2 present, RRR , no murmurs or rubs, no carotid bruits,  + PP bilaterally  ABD: Soft, non-tender, non-distended, +BS  EXT: No ANGIE  SKIN: Intact    LABS:                        13.4   10.93 )-----------( 196      ( 14 Apr 2022 22:00 )             40.0     04-14    137  |  103  |  15  ----------------------------<  110<H>  3.9   |  24  |  0.7    Ca    8.1<L>      14 Apr 2022 20:14  Phos  3.1     04-14  Mg     2.0     04-14    TPro  5.7<L>  /  Alb  3.4<L>  /  TBili  0.5  /  DBili  0.2  /  AST  21  /  ALT  28  /  AlkPhos  65  04-14    PT/INR - ( 14 Apr 2022 22:00 )   PT: 13.40 sec;   INR: 1.17 ratio         PTT - ( 14 Apr 2022 22:00 )  PTT:28.2 sec  Creatine Kinase, Serum: 247 U/L *H* (04-14-22 @ 19:12)  Troponin T, Serum: <0.01 ng/mL (04-14-22 @ 17:15)    CARDIAC MARKERS ( 14 Apr 2022 19:12 )  x     / x     / 247 U/L / x     / x      CARDIAC MARKERS ( 14 Apr 2022 17:15 )  x     / <0.01 ng/mL / x     / x     / x            Troponin trend:            RADIOLOGY:  < from: CT Head No Cont (04.14.22 @ 20:39) >  IMPRESSION:    Status post thrombectomy of left M2 MCA occlusion. No detectable acute   territorial infarct, intracranial hemorrhage, or midline shift.    --- End of Report ---    < end of copied text >      ECG:  Afib rate controlled with PVCs

## 2022-04-16 LAB
A1C WITH ESTIMATED AVERAGE GLUCOSE RESULT: 6.3 % — HIGH (ref 4–5.6)
ALBUMIN SERPL ELPH-MCNC: 3.5 G/DL — SIGNIFICANT CHANGE UP (ref 3.5–5.2)
ALP SERPL-CCNC: 62 U/L — SIGNIFICANT CHANGE UP (ref 30–115)
ALT FLD-CCNC: 27 U/L — SIGNIFICANT CHANGE UP (ref 0–41)
ANION GAP SERPL CALC-SCNC: 8 MMOL/L — SIGNIFICANT CHANGE UP (ref 7–14)
APTT BLD: 48.7 SEC — HIGH (ref 27–39.2)
APTT BLD: 55.3 SEC — HIGH (ref 27–39.2)
AST SERPL-CCNC: 27 U/L — SIGNIFICANT CHANGE UP (ref 0–41)
BASOPHILS # BLD AUTO: 0.02 K/UL — SIGNIFICANT CHANGE UP (ref 0–0.2)
BASOPHILS # BLD AUTO: 0.03 K/UL — SIGNIFICANT CHANGE UP (ref 0–0.2)
BASOPHILS NFR BLD AUTO: 0.2 % — SIGNIFICANT CHANGE UP (ref 0–1)
BASOPHILS NFR BLD AUTO: 0.3 % — SIGNIFICANT CHANGE UP (ref 0–1)
BILIRUB SERPL-MCNC: 0.4 MG/DL — SIGNIFICANT CHANGE UP (ref 0.2–1.2)
BUN SERPL-MCNC: 13 MG/DL — SIGNIFICANT CHANGE UP (ref 10–20)
CALCIUM SERPL-MCNC: 8.4 MG/DL — LOW (ref 8.5–10.1)
CHLORIDE SERPL-SCNC: 106 MMOL/L — SIGNIFICANT CHANGE UP (ref 98–110)
CHOLEST SERPL-MCNC: 154 MG/DL — SIGNIFICANT CHANGE UP
CO2 SERPL-SCNC: 26 MMOL/L — SIGNIFICANT CHANGE UP (ref 17–32)
CREAT SERPL-MCNC: 0.7 MG/DL — SIGNIFICANT CHANGE UP (ref 0.7–1.5)
EGFR: 100 ML/MIN/1.73M2 — SIGNIFICANT CHANGE UP
EOSINOPHIL # BLD AUTO: 0.06 K/UL — SIGNIFICANT CHANGE UP (ref 0–0.7)
EOSINOPHIL # BLD AUTO: 0.08 K/UL — SIGNIFICANT CHANGE UP (ref 0–0.7)
EOSINOPHIL NFR BLD AUTO: 0.6 % — SIGNIFICANT CHANGE UP (ref 0–8)
EOSINOPHIL NFR BLD AUTO: 0.9 % — SIGNIFICANT CHANGE UP (ref 0–8)
ESTIMATED AVERAGE GLUCOSE: 134 MG/DL — HIGH (ref 68–114)
GLUCOSE SERPL-MCNC: 105 MG/DL — HIGH (ref 70–99)
HCT VFR BLD CALC: 39.3 % — LOW (ref 42–52)
HCT VFR BLD CALC: 39.7 % — LOW (ref 42–52)
HDLC SERPL-MCNC: 44 MG/DL — SIGNIFICANT CHANGE UP
HGB BLD-MCNC: 13 G/DL — LOW (ref 14–18)
HGB BLD-MCNC: 13.2 G/DL — LOW (ref 14–18)
IMM GRANULOCYTES NFR BLD AUTO: 0.5 % — HIGH (ref 0.1–0.3)
IMM GRANULOCYTES NFR BLD AUTO: 0.7 % — HIGH (ref 0.1–0.3)
LIPID PNL WITH DIRECT LDL SERPL: 94 MG/DL — SIGNIFICANT CHANGE UP
LYMPHOCYTES # BLD AUTO: 1.92 K/UL — SIGNIFICANT CHANGE UP (ref 1.2–3.4)
LYMPHOCYTES # BLD AUTO: 2.54 K/UL — SIGNIFICANT CHANGE UP (ref 1.2–3.4)
LYMPHOCYTES # BLD AUTO: 21.1 % — SIGNIFICANT CHANGE UP (ref 20.5–51.1)
LYMPHOCYTES # BLD AUTO: 24.8 % — SIGNIFICANT CHANGE UP (ref 20.5–51.1)
MAGNESIUM SERPL-MCNC: 2.2 MG/DL — SIGNIFICANT CHANGE UP (ref 1.8–2.4)
MCHC RBC-ENTMCNC: 30.7 PG — SIGNIFICANT CHANGE UP (ref 27–31)
MCHC RBC-ENTMCNC: 30.7 PG — SIGNIFICANT CHANGE UP (ref 27–31)
MCHC RBC-ENTMCNC: 33.1 G/DL — SIGNIFICANT CHANGE UP (ref 32–37)
MCHC RBC-ENTMCNC: 33.2 G/DL — SIGNIFICANT CHANGE UP (ref 32–37)
MCV RBC AUTO: 92.3 FL — SIGNIFICANT CHANGE UP (ref 80–94)
MCV RBC AUTO: 92.9 FL — SIGNIFICANT CHANGE UP (ref 80–94)
MONOCYTES # BLD AUTO: 0.75 K/UL — HIGH (ref 0.1–0.6)
MONOCYTES # BLD AUTO: 0.87 K/UL — HIGH (ref 0.1–0.6)
MONOCYTES NFR BLD AUTO: 8.2 % — SIGNIFICANT CHANGE UP (ref 1.7–9.3)
MONOCYTES NFR BLD AUTO: 8.5 % — SIGNIFICANT CHANGE UP (ref 1.7–9.3)
NEUTROPHILS # BLD AUTO: 6.27 K/UL — SIGNIFICANT CHANGE UP (ref 1.4–6.5)
NEUTROPHILS # BLD AUTO: 6.71 K/UL — HIGH (ref 1.4–6.5)
NEUTROPHILS NFR BLD AUTO: 65.4 % — SIGNIFICANT CHANGE UP (ref 42.2–75.2)
NEUTROPHILS NFR BLD AUTO: 68.8 % — SIGNIFICANT CHANGE UP (ref 42.2–75.2)
NON HDL CHOLESTEROL: 110 MG/DL — SIGNIFICANT CHANGE UP
NRBC # BLD: 0 /100 WBCS — SIGNIFICANT CHANGE UP (ref 0–0)
NRBC # BLD: 0 /100 WBCS — SIGNIFICANT CHANGE UP (ref 0–0)
PLATELET # BLD AUTO: 185 K/UL — SIGNIFICANT CHANGE UP (ref 130–400)
PLATELET # BLD AUTO: 207 K/UL — SIGNIFICANT CHANGE UP (ref 130–400)
POTASSIUM SERPL-MCNC: 4.2 MMOL/L — SIGNIFICANT CHANGE UP (ref 3.5–5)
POTASSIUM SERPL-SCNC: 4.2 MMOL/L — SIGNIFICANT CHANGE UP (ref 3.5–5)
PROT SERPL-MCNC: 5.8 G/DL — LOW (ref 6–8)
RBC # BLD: 4.23 M/UL — LOW (ref 4.7–6.1)
RBC # BLD: 4.3 M/UL — LOW (ref 4.7–6.1)
RBC # FLD: 13.8 % — SIGNIFICANT CHANGE UP (ref 11.5–14.5)
RBC # FLD: 13.9 % — SIGNIFICANT CHANGE UP (ref 11.5–14.5)
SODIUM SERPL-SCNC: 140 MMOL/L — SIGNIFICANT CHANGE UP (ref 135–146)
TRIGL SERPL-MCNC: 76 MG/DL — SIGNIFICANT CHANGE UP
WBC # BLD: 10.25 K/UL — SIGNIFICANT CHANGE UP (ref 4.8–10.8)
WBC # BLD: 9.11 K/UL — SIGNIFICANT CHANGE UP (ref 4.8–10.8)
WBC # FLD AUTO: 10.25 K/UL — SIGNIFICANT CHANGE UP (ref 4.8–10.8)
WBC # FLD AUTO: 9.11 K/UL — SIGNIFICANT CHANGE UP (ref 4.8–10.8)

## 2022-04-16 PROCEDURE — 70450 CT HEAD/BRAIN W/O DYE: CPT | Mod: 26

## 2022-04-16 PROCEDURE — 99291 CRITICAL CARE FIRST HOUR: CPT

## 2022-04-16 PROCEDURE — 99222 1ST HOSP IP/OBS MODERATE 55: CPT

## 2022-04-16 RX ORDER — SODIUM CHLORIDE 9 MG/ML
500 INJECTION INTRAMUSCULAR; INTRAVENOUS; SUBCUTANEOUS ONCE
Refills: 0 | Status: COMPLETED | OUTPATIENT
Start: 2022-04-16 | End: 2022-04-16

## 2022-04-16 RX ORDER — APIXABAN 2.5 MG/1
5 TABLET, FILM COATED ORAL EVERY 12 HOURS
Refills: 0 | Status: DISCONTINUED | OUTPATIENT
Start: 2022-04-16 | End: 2022-04-17

## 2022-04-16 RX ADMIN — Medication 3 MILLILITER(S): at 08:17

## 2022-04-16 RX ADMIN — Medication 3 MILLILITER(S): at 20:34

## 2022-04-16 RX ADMIN — BUDESONIDE AND FORMOTEROL FUMARATE DIHYDRATE 2 PUFF(S): 160; 4.5 AEROSOL RESPIRATORY (INHALATION) at 20:58

## 2022-04-16 RX ADMIN — FAMOTIDINE 20 MILLIGRAM(S): 10 INJECTION INTRAVENOUS at 13:39

## 2022-04-16 RX ADMIN — BUDESONIDE AND FORMOTEROL FUMARATE DIHYDRATE 2 PUFF(S): 160; 4.5 AEROSOL RESPIRATORY (INHALATION) at 08:53

## 2022-04-16 RX ADMIN — SODIUM CHLORIDE 1000 MILLILITER(S): 9 INJECTION INTRAMUSCULAR; INTRAVENOUS; SUBCUTANEOUS at 00:34

## 2022-04-16 RX ADMIN — Medication 3 MILLILITER(S): at 16:29

## 2022-04-16 RX ADMIN — APIXABAN 5 MILLIGRAM(S): 2.5 TABLET, FILM COATED ORAL at 20:58

## 2022-04-16 RX ADMIN — ATORVASTATIN CALCIUM 80 MILLIGRAM(S): 80 TABLET, FILM COATED ORAL at 21:02

## 2022-04-16 NOTE — PROGRESS NOTE ADULT - ASSESSMENT
70 y/o male with COPD, HTN and VICKI on CPAP was brought for evaluation of confusion,    # CVA with Left MCA infarction s/p thrombectomy  - neurocheck q2h, NICU monitor, activity increase as tolerated  - EKG shows atrial fibrillation; currently on Heparin gtt Target PTT 50-60; Repeat CT head today - if stable switch to DOAC  - PT/OT/Speech/language eval  - Neuroendovascular f/u  - SBP goal <180  - Echo - EF 64%; no valvular abnormalities    # Afib  # Bradycardia  - EP following  - DOAC once Repeat CT head stable  - Avoid AV joseph blocking agents  - Dopamine Gtt if needed for persistent bradycardia    # HTN: - SBP goal <180    # distal DVT: Right post tibial  - heparin sq for now, switch to DOAC if Hb stable    # COPD:  - symbicort and albuterol  - one dose of methylprednisone     # VICKI: on CPAP at night  - will see if we can resume CPAP tonight    # Diet: regular  # DVT pro: On heparin  # GI pro: Famotidine  # Dispo: Neuro ICU   72 y/o male with COPD, HTN and VICKI on CPAP was brought for evaluation of confusion,    # CVA with Left MCA infarction s/p thrombectomy  - neurocheck q2h, NICU monitor, activity increase as tolerated  - EKG shows atrial fibrillation; currently on Heparin gtt Target PTT 50-60; Repeat CT head today - if stable switch to DOAC  - PT/OT/Speech/language eval  - Neuroendovascular f/u  - SBP goal <180  - Echo - EF 64%; no valvular abnormalities    # Afib  # Bradycardia  - EP following  - DOAC once Repeat CT head stable  - Avoid AV joseph blocking agents  - Dopamine Gtt if needed for persistent bradycardia    # HTN: - SBP goal <180    # distal DVT: Right post tibial  - heparin sq for now, switch to DOAC if Hb stable    # COPD:  - No wheezing  - symbicort and albuterol  - s/p one dose of methylprednisone     # VICKI: on CPAP at night  - CPAP at night    # Diet: regular  # DVT pro: On heparin  # GI pro: Famotidine  # Dispo: Neuro ICU

## 2022-04-16 NOTE — PROGRESS NOTE ADULT - SUBJECTIVE AND OBJECTIVE BOX
SUBJECTIVE:   LENGTH OF HOSPITAL STAY: 2d    CHIEF COMPLAINT:  Patient is a 71y old  Male who presents with a chief complaint of aphasia (15 Apr 2022 14:44)      Principle Diagnosis: Left MCA infarct M2 occlusion; s/p thrombectomy    Events over the past 24 hours:  Patient was seen at the bedside this morning. He is lying comfortably on the bed. There were no acute events overnight. Alert and oriented x 3, moving all extremities.       REVIEW OF SYSTEMS  Negative Except as mentioned above.    ALLERGIES:  No Known Allergies      MEDICATIONS:  STANDING MEDICATIONS  albuterol/ipratropium for Nebulization 3 milliLiter(s) Nebulizer every 6 hours  atorvastatin 80 milliGRAM(s) Oral at bedtime  budesonide 160 MICROgram(s)/formoterol 4.5 MICROgram(s) Inhaler 2 Puff(s) Inhalation two times a day  chlorhexidine 4% Liquid 1 Application(s) Topical <User Schedule>  famotidine    Tablet 20 milliGRAM(s) Oral daily  heparin  Infusion 1400 Unit(s)/Hr IV Continuous <Continuous>  sodium chloride 0.9%. 1000 milliLiter(s) IV Continuous <Continuous>    PRN MEDICATIONS  acetaminophen     Tablet .. 650 milliGRAM(s) Oral every 6 hours PRN  guaiFENesin  milliGRAM(s) Oral every 12 hours PRN          OBJECTIVE:  VITALS:   T(F): 96.9 (04-16-22 @ 12:00), Max: 97.6 (04-15-22 @ 20:00)  HR: 68 (04-16-22 @ 15:00) (52 - 72)  BP: 102/63 (04-16-22 @ 15:00) (75/48 - 117/74)  BP(mean): 75 (04-16-22 @ 15:00) (59 - 100)  RR: 19 (04-16-22 @ 15:00) (12 - 51)  SpO2: 95% (04-16-22 @ 15:00) (94% - 100%)    I&O's:   I&O's Summary    15 Apr 2022 07:01  -  16 Apr 2022 07:00  --------------------------------------------------------  IN: 3928 mL / OUT: 1570 mL / NET: 2358 mL    16 Apr 2022 07:01  -  16 Apr 2022 16:04  --------------------------------------------------------  IN: 708 mL / OUT: 900 mL / NET: -192 mL         Vent Settings       Current Drips/rates:     PHYSICAL EXAM:  GEN: NAD, comfortable  LUNGS: CTAB, no w/r/r  HEART: Irregular, s1 and s2 appreciated, no m/r/g  ABD: soft, NT/ND, +BS  EXT: no edema, PP b/l  EXT: no edema, PP bilateralh 5/5 on bilateral upper and lower extremities.     LABS:                        13.0   9.11  )-----------( 207      ( 16 Apr 2022 11:00 )             39.3             04-16    140  |  106  |  13  ----------------------------<  105<H>  4.2   |  26  |  0.7    Ca    8.4<L>      16 Apr 2022 05:20  Phos  3.1     04-14  Mg     2.2     04-16    TPro  5.8<L>  /  Alb  3.5  /  TBili  0.4  /  DBili  x   /  AST  27  /  ALT  27  /  AlkPhos  62  04-16    LIVER FUNCTIONS - ( 16 Apr 2022 05:20 )  Alb: 3.5 g/dL / Pro: 5.8 g/dL / ALK PHOS: 62 U/L / ALT: 27 U/L / AST: 27 U/L / GGT: x                 PT/INR - ( 14 Apr 2022 22:00 )   PT: 13.40 sec;   INR: 1.17 ratio         PTT - ( 16 Apr 2022 11:00 )  PTT:55.3 sec  CARDIAC MARKERS ( 15 Apr 2022 06:29 )  x     / <0.01 ng/mL / x     / x     / x      CARDIAC MARKERS ( 14 Apr 2022 19:12 )  x     / x     / 247 U/L / x     / x      CARDIAC MARKERS ( 14 Apr 2022 17:15 )  x     / <0.01 ng/mL / x     / x     / x                Blood Glucose:  113 (04-14-22 @ 16:42)        RADIOLOGY & ADDITIONAL TESTS:  Xray Chest 1 View- PORTABLE-Urgent:   ACC: [id number] EXAM:  XR CHEST PORTABLE URGENT 1V                          PROCEDURE DATE:  04/15/2022          INTERPRETATION:  Clinical History / Reason for exam: Shortness of breath    Comparison : Chest radiograph January 19, 2015.    Technique/Positioning: Low lung volume.    Findings:    Support devices: None.    Cardiac/mediastinum/hilum: Magnified, unchanged.    Lung parenchyma/Pleura: Right lung opacity including a calcified   granuloma at the right base, unchanged. Nodular opacity at the left base.   A CT scan of the chest is recommended. No pneumothorax is seen.    Skeleton/soft tissues: Stable.    Impression:    Low lung volume.    Right lung opacity including a calcified granuloma at the right base,   unchanged. Nodular opacity at the left base. A CT scan of the chest is   recommended.    --- End of Report ---            BATSHEVA KERN MD; Attending Radiologist  This document has been electronically signed. Apr 15 2022  9:59AM (04-15-22 @ 08:12)

## 2022-04-17 LAB
ALBUMIN SERPL ELPH-MCNC: 3.6 G/DL — SIGNIFICANT CHANGE UP (ref 3.5–5.2)
ALP SERPL-CCNC: 61 U/L — SIGNIFICANT CHANGE UP (ref 30–115)
ALT FLD-CCNC: 27 U/L — SIGNIFICANT CHANGE UP (ref 0–41)
ANION GAP SERPL CALC-SCNC: 9 MMOL/L — SIGNIFICANT CHANGE UP (ref 7–14)
AST SERPL-CCNC: 25 U/L — SIGNIFICANT CHANGE UP (ref 0–41)
BILIRUB SERPL-MCNC: 0.4 MG/DL — SIGNIFICANT CHANGE UP (ref 0.2–1.2)
BUN SERPL-MCNC: 14 MG/DL — SIGNIFICANT CHANGE UP (ref 10–20)
CALCIUM SERPL-MCNC: 8.2 MG/DL — LOW (ref 8.5–10.1)
CHLORIDE SERPL-SCNC: 106 MMOL/L — SIGNIFICANT CHANGE UP (ref 98–110)
CO2 SERPL-SCNC: 27 MMOL/L — SIGNIFICANT CHANGE UP (ref 17–32)
CREAT SERPL-MCNC: 0.7 MG/DL — SIGNIFICANT CHANGE UP (ref 0.7–1.5)
EGFR: 100 ML/MIN/1.73M2 — SIGNIFICANT CHANGE UP
GLUCOSE SERPL-MCNC: 109 MG/DL — HIGH (ref 70–99)
HCT VFR BLD CALC: 38.1 % — LOW (ref 42–52)
HGB BLD-MCNC: 12.3 G/DL — LOW (ref 14–18)
MAGNESIUM SERPL-MCNC: 2.1 MG/DL — SIGNIFICANT CHANGE UP (ref 1.8–2.4)
MCHC RBC-ENTMCNC: 30 PG — SIGNIFICANT CHANGE UP (ref 27–31)
MCHC RBC-ENTMCNC: 32.3 G/DL — SIGNIFICANT CHANGE UP (ref 32–37)
MCV RBC AUTO: 92.9 FL — SIGNIFICANT CHANGE UP (ref 80–94)
NRBC # BLD: 0 /100 WBCS — SIGNIFICANT CHANGE UP (ref 0–0)
PLATELET # BLD AUTO: 194 K/UL — SIGNIFICANT CHANGE UP (ref 130–400)
POTASSIUM SERPL-MCNC: 4 MMOL/L — SIGNIFICANT CHANGE UP (ref 3.5–5)
POTASSIUM SERPL-SCNC: 4 MMOL/L — SIGNIFICANT CHANGE UP (ref 3.5–5)
PROT SERPL-MCNC: 5.7 G/DL — LOW (ref 6–8)
RBC # BLD: 4.1 M/UL — LOW (ref 4.7–6.1)
RBC # FLD: 13.7 % — SIGNIFICANT CHANGE UP (ref 11.5–14.5)
SODIUM SERPL-SCNC: 142 MMOL/L — SIGNIFICANT CHANGE UP (ref 135–146)
WBC # BLD: 8.84 K/UL — SIGNIFICANT CHANGE UP (ref 4.8–10.8)
WBC # FLD AUTO: 8.84 K/UL — SIGNIFICANT CHANGE UP (ref 4.8–10.8)

## 2022-04-17 PROCEDURE — 99291 CRITICAL CARE FIRST HOUR: CPT

## 2022-04-17 PROCEDURE — 99232 SBSQ HOSP IP/OBS MODERATE 35: CPT

## 2022-04-17 RX ORDER — APIXABAN 2.5 MG/1
10 TABLET, FILM COATED ORAL EVERY 12 HOURS
Refills: 0 | Status: DISCONTINUED | OUTPATIENT
Start: 2022-04-17 | End: 2022-04-18

## 2022-04-17 RX ADMIN — SODIUM CHLORIDE 75 MILLILITER(S): 9 INJECTION INTRAMUSCULAR; INTRAVENOUS; SUBCUTANEOUS at 23:25

## 2022-04-17 RX ADMIN — FAMOTIDINE 20 MILLIGRAM(S): 10 INJECTION INTRAVENOUS at 13:09

## 2022-04-17 RX ADMIN — ATORVASTATIN CALCIUM 80 MILLIGRAM(S): 80 TABLET, FILM COATED ORAL at 21:21

## 2022-04-17 RX ADMIN — BUDESONIDE AND FORMOTEROL FUMARATE DIHYDRATE 2 PUFF(S): 160; 4.5 AEROSOL RESPIRATORY (INHALATION) at 21:21

## 2022-04-17 RX ADMIN — APIXABAN 10 MILLIGRAM(S): 2.5 TABLET, FILM COATED ORAL at 18:52

## 2022-04-17 RX ADMIN — BUDESONIDE AND FORMOTEROL FUMARATE DIHYDRATE 2 PUFF(S): 160; 4.5 AEROSOL RESPIRATORY (INHALATION) at 13:10

## 2022-04-17 RX ADMIN — Medication 600 MILLIGRAM(S): at 22:50

## 2022-04-17 RX ADMIN — APIXABAN 5 MILLIGRAM(S): 2.5 TABLET, FILM COATED ORAL at 06:31

## 2022-04-17 RX ADMIN — Medication 3 MILLILITER(S): at 08:22

## 2022-04-17 NOTE — PROGRESS NOTE ADULT - SUBJECTIVE AND OBJECTIVE BOX
INTERVAL HPI/OVERNIGHT EVENTS:  converted to NSR last night ~7:30pm  in NSR 70-80s bpm with episodes of bradycardia 50-55bpm overnight    MEDICATIONS  (STANDING):  albuterol/ipratropium for Nebulization 3 milliLiter(s) Nebulizer every 6 hours  apixaban 5 milliGRAM(s) Oral every 12 hours  atorvastatin 80 milliGRAM(s) Oral at bedtime  budesonide 160 MICROgram(s)/formoterol 4.5 MICROgram(s) Inhaler 2 Puff(s) Inhalation two times a day  chlorhexidine 4% Liquid 1 Application(s) Topical <User Schedule>  famotidine    Tablet 20 milliGRAM(s) Oral daily  sodium chloride 0.9%. 1000 milliLiter(s) (75 mL/Hr) IV Continuous <Continuous>    MEDICATIONS  (PRN):  acetaminophen     Tablet .. 650 milliGRAM(s) Oral every 6 hours PRN Temp greater or equal to 38C (100.4F), Mild Pain (1 - 3), Moderate Pain (4 - 6)  guaiFENesin  milliGRAM(s) Oral every 12 hours PRN Cough      Allergies    No Known Allergies    Intolerances        REVIEW OF SYSTEMS    [x ] A ten-point review of systems was otherwise negative except as noted.  [ ] Due to altered mental status/intubation, subjective information were not able to be obtained from the patient. History was obtained, to the extent possible, from review of the chart and collateral sources of information.      Vital Signs Last 24 Hrs  T(C): 36.6 (17 Apr 2022 04:00), Max: 36.9 (16 Apr 2022 20:00)  T(F): 97.8 (17 Apr 2022 04:00), Max: 98.5 (16 Apr 2022 20:00)  HR: 76 (17 Apr 2022 07:00) (54 - 88)  BP: 118/74 (17 Apr 2022 07:00) (91/52 - 140/51)  BP(mean): 94 (17 Apr 2022 07:00) (64 - 99)  RR: 32 (17 Apr 2022 07:00) (4 - 41)  SpO2: 94% (17 Apr 2022 07:00) (93% - 100%)    04-16-22 @ 07:01  -  04-17-22 @ 07:00  --------------------------------------------------------  IN: 1857 mL / OUT: 2050 mL / NET: -193 mL        PHYSICAL EXAM:    GENERAL: In no apparent distress, well nourished, and hydrated.  HEART: Regular rate and rhythm; No murmur; NO rubs, or gallops.  PULMONARY: Clear to auscultation and percussion.  Normal expansion/effort. No rales, wheezing, or rhonchi bilaterally.  ABDOMEN: Soft, Nontender, Nondistended; Bowel sounds present  EXTREMITIES:  Extremities warm, pink, well-perfused, 2+ Peripheral Pulses, No clubbing, cyanosis, or edema  NEUROLOGICAL: alert & oriented x 3, no focal deficits, PERRLA, EOMI    LABS:                        12.3   8.84  )-----------( 194      ( 17 Apr 2022 04:30 )             38.1     04-17    142  |  106  |  14  ----------------------------<  109<H>  4.0   |  27  |  0.7    Ca    8.2<L>      17 Apr 2022 04:30  Mg     2.1     04-17    TPro  5.7<L>  /  Alb  3.6  /  TBili  0.4  /  DBili  x   /  AST  25  /  ALT  27  /  AlkPhos  61  04-17    PTT - ( 16 Apr 2022 11:00 )  PTT:55.3 sec    COVID-19 PCR: NotDetec (04-14-22 @ 17:15)    Thyroid Stimulating Hormone, Serum: 0.94 uIU/mL (04.15.22 @ 07:39)          RADIOLOGY & ADDITIONAL TESTS:    < from: TTE Echo Complete w/o Contrast w/ Doppler (04.15.22 @ 11:53) >  Summary:   1. Normal globalleft ventricular systolic function.   2. LV Ejection Fraction by Purcell's Method with a biplane EF of 64 %.   3. Normal left ventricular internal cavity size.   4. Normal left atrial size.   5. Normal right atrial size.   6. No evidence of mitral valve regurgitation.   7. Dilated ascending aorta to a diameter of 4.4 cm.   8. LA volume Index is 33.5 ml/m² ml/m2.    < end of copied text >

## 2022-04-17 NOTE — PHYSICAL THERAPY INITIAL EVALUATION ADULT - PREDICTED DURATION OF THERAPY (DAYS/WKS), PT EVAL
PT evaluation only, no further skilled PT intervention required, patient indep in all aspects of mobility without AD; sup with stair negotiation.

## 2022-04-17 NOTE — PHYSICAL THERAPY INITIAL EVALUATION ADULT - PERTINENT HX OF CURRENT PROBLEM, REHAB EVAL
72 y/o male with COPD, HTN and VICKI on CPAP was brought for evaluation of confusion, diagnosed at the hospital to have a CVA.

## 2022-04-17 NOTE — PHYSICAL THERAPY INITIAL EVALUATION ADULT - GENERAL OBSERVATIONS, REHAB EVAL
patient encountered sitting on chair bedside, NAD, agreeable to PT evaluation. Time in: 14:30 Time out: 15:00

## 2022-04-17 NOTE — PROGRESS NOTE ADULT - SUBJECTIVE AND OBJECTIVE BOX
SUBJECTIVE:  Patient is a 71y old Male who presents with a chief complaint of aphasia (17 Apr 2022 12:29)   Currently admitted to medicine with the primary diagnosis of Acute right MCA stroke     Today is hospital day 3d. There are no new issues or overnight events.     HPI  HPI:  70 y/o male with COPD, HTN and VICKI on CPAP was brought for evaluation of confusion,    the pt was doing well today, he dropped his GF to her work, later after he arrived home he felt headache, took motrin with no improvement,   at 3pm, he drove and  his GF from the wrok and told her he has headache and not feeling well, after 30 min he was not able  to remember things including his GF name, then he had truobkle finding words and was making non comprehnsive talk,     pt not on antiplatelets or blood thinner, no hx of fever, previous CVA, or CAD, no cough, CP, recent travel, urianry symptoms, seizure or syncope.  [place] called for confusion, aphasia and dysarthria, CTH negative but CT Angio +ve for Abrupt occlusion in the inferior terminal branch of the left M2 MCA  and CTP +ve for Perfusion imaging predicts a core infarct of 7 mL in the left temporal   lobe within the left MCA territory.  pt was out of window for TPA, but sent to [place] for thrombectomy, s/p  left MCA M2 branch, per NE there's distal clots at M3 not able to be reached, pt was intubated during procedure  and successfully extubated, currently pt follows simple commands, more awake and alert, still have mild dysarthria and aphasia        (14 Apr 2022 21:25)      PAST MEDICAL & SURGICAL HISTORY  VICKI on CPAP      ALLERGIES:  No Known Allergies    MEDICATIONS:  HOME MEDICATIONS  Symbicort 160 mcg-4.5 mcg/inh inhalation aerosol: 2 puff(s) inhaled 2 times a day    STANDING MEDICATIONS  albuterol/ipratropium for Nebulization 3 milliLiter(s) Nebulizer every 6 hours  apixaban 10 milliGRAM(s) Oral every 12 hours  atorvastatin 80 milliGRAM(s) Oral at bedtime  budesonide 160 MICROgram(s)/formoterol 4.5 MICROgram(s) Inhaler 2 Puff(s) Inhalation two times a day  chlorhexidine 4% Liquid 1 Application(s) Topical <User Schedule>  famotidine    Tablet 20 milliGRAM(s) Oral daily  sodium chloride 0.9%. 1000 milliLiter(s) IV Continuous <Continuous>    PRN MEDICATIONS  acetaminophen     Tablet .. 650 milliGRAM(s) Oral every 6 hours PRN  guaiFENesin  milliGRAM(s) Oral every 12 hours PRN    VITALS:   T(C): 36.6 (04-17-22 @ 04:00), Max: 36.9 (04-16-22 @ 20:00)  T(F): 97.8 (04-17-22 @ 04:00), Max: 98.5 (04-16-22 @ 20:00)  HR: 78 (04-17-22 @ 11:00) (54 - 88)  BP: 123/74 (04-17-22 @ 11:00) (91/52 - 140/51)  BP(mean): 92 (04-17-22 @ 11:00) (64 - 99)  ABP: --  ABP(mean): --  RR: 40 (04-17-22 @ 11:00) (4 - 41)  SpO2: 94% (04-17-22 @ 11:00) (93% - 100%)  LABS:                        12.3   8.84  )-----------( 194      ( 17 Apr 2022 04:30 )             38.1     04-17    142  |  106  |  14  ----------------------------<  109<H>  4.0   |  27  |  0.7    Ca    8.2<L>      17 Apr 2022 04:30  Mg     2.1     04-17    TPro  5.7<L>  /  Alb  3.6  /  TBili  0.4  /  DBili  x   /  AST  25  /  ALT  27  /  AlkPhos  61  04-17    PTT - ( 16 Apr 2022 11:00 )  PTT:55.3 sec    I&O's Detail    16 Apr 2022 07:01  -  17 Apr 2022 07:00  --------------------------------------------------------  IN:    Heparin: 132 mL    sodium chloride 0.9%: 1725 mL  Total IN: 1857 mL    OUT:    Voided (mL): 2050 mL  Total OUT: 2050 mL    Total NET: -193 mL      RADIOLOGY:  EKG  12 Lead ECG:   Systolic  mmHg    Diastolic BP 54 mmHg    Ventricular Rate 61 BPM    Atrial Rate 61 BPM    QRS Duration 92 ms    Q-T Interval 413 ms    QTC Calculation(Bazett) 416 ms    R Axis 83 degrees    T Axis 76 degrees    Diagnosis Line *** Poor data quality, interpretation may be adversely affected  Atrial fibrillation with premature ventricular or aberrantly conducted  complexes  Abnormal ECG    Confirmed by KETAN DUENAS MD ([id number]) on 4/15/2022 6:16:28 AM (04-15-22 @ 01:36)    CT Head No Cont:   ACC: [id number] EXAM:  CT BRAIN                          PROCEDURE DATE:  04/16/2022          INTERPRETATION:  CLINICAL INDICATION: Left MCA infarct status post   thrombectomy.    Technique: CT of the head was performed without contrast.    Multiple contiguous axial images were acquired from the skullbase to the   vertex without the administration of intravenous contrast.  Coronal and   sagittal reformations were made.    COMPARISON:  prior head CT dated 4/15/2022    FINDINGS:    Redemonstration of gray-white distinction loss involving the left   temporal lobe without hemorrhagic transformation.    The ventricles and sulci are unremarkable in appearance.     There is no intraparenchymal hematoma, mass effect or midline shift. No   abnormal extra-axial fluid collections are present.    The calvarium is intact. The visualized intraorbital compartments,   paranasal sinuses and mastoid complexes appear free of acute disease.    IMPRESSION:  No significant change since recent head CT of 4/15/2022 with evolving   acute left MCA territory infarct. No hemorrhagic transformation.    --- End of Report ---      ACC: [id number] EXAM:  CT BRAIN                          PROCEDURE DATE:  04/14/2022          INTERPRETATION:  CLINICAL INDICATION: Left MCA occlusion, follow-up    TECHNIQUE: CT of the head was performed without the administration of   intravenous contrast.    COMPARISON: Same day CT head    FINDINGS:    There is increased attenuation of the dura and intracranial vessels due   to recent intravenous contrast administration.    There is no acute territorial infarct, or intracranial hemorrhage. There   is no space-occupying lesion or midline shift.    There is no evidence of hydrocephalus. There are no extra-axial fluid   collections.    The visualized intraorbital contents are normal. Increased mucosal   thickening in bilateral ethmoid sinuses. Stable under pneumatization of   the left mastoid. The visualized soft tissues and osseous structures   appear normal.      IMPRESSION:    Status post thrombectomy of left M2 MCA occlusion. No detectable acute   territorial infarct, intracranial hemorrhage, or midline shift.    --- End of Report ---      CT Angio Neck w/ IV Cont:   ACC: [id number] EXAM:  CT ANGIO BRAIN (W)AW IC                        ACC: [id number] EXAM:  CT ANGIO NECK (W)AW IC                        ACC: [id number] EXAM:  CT PERFUSION W MAPS IC                          PROCEDURE DATE:  04/14/2022          INTERPRETATION:  CLINICAL INDICATION: Stroke code    TECHNIQUE: CT perfusion of the brain was performed following the bolus   administration of intravenous contrast. Source images were postprocessed   on an independent workstation under direct physician supervision and   archived to the PACS. CTA of the head and neck was performed after the   intravenous administration of of contrast. 3-D reconstructions were   performed under physician supervision on a separate workstation and   reviewed. A total of 120mL of Optiray 320 nonionic IV contrast was   administered.    COMPARISON: None available.    FINDINGS:    CT PERFUSION:  Perfusion imaging predicts a core infarct of 7 mL in the left temporal   lobe within the left MCA territory. Based on the perfusion mismatch,   there is a predicted volume of 30 mL penumbra of tissue at risk. Mismatch   ratio of 5.3.    CTA HEAD/NECK:    AORTIC ARCH: Normal.    RIGHT ANTERIOR CIRCULATION:  The common carotid artery (CCA) is patent up to the bulb without   stenosis. The carotid bulb is patent. The external carotid artery (ECA)   and its proximal branches are patent without stenosis. The cervical   internal carotid artery (ICA) is patent without stenosis. The   intracranial internal carotid artery is patent without stenosis.    The anterior and middle cerebral arteries are patent without stenosis.      LEFT ANTERIOR CIRCULATION:  The CCA is patent up to the bulb without stenosis. The carotid bulb is   patent. The ECA and its proximal branches are patent without stenosis.   The cervical ICA is patent without stenosis. The intracranial ICA is   patent without stenosis.    There is abrupt occlusion in the inferior terminal branch of the left M2   MCA (3-384 through 390).. The remaining MCA branches are patent.    The anterior cerebral artery is patent without stenosis.      POSTERIOR CIRCULATION:  The vertebral arteries are patent without stenosis bilaterally. The   basilar artery is patent without stenosis. The proximal branch   vasculature of the posterior circulation are within normal limits.    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 There is no evidence for   associated cavernous malformation (cavernoma).    The posterior cerebral arteries are patent without stenosis.    There is no evidence for saccular aneurysm or vascular malformation.      OTHERS:  There is mild multilevel severe changes of the cervical spine.      IMPRESSION:    CT PERFUSION:  Perfusion imaging predicts a core infarct of 7 mL in the left temporal   lobe within the left MCA territory. Based on the perfusion mismatch,   there is a predicted volume of 30 mL penumbra of tissue at risk. Mismatch   ratio of 5.3.    CTA HEAD/NECK:  Abrupt occlusion in the inferior terminal branch of the left M2 MCA. The   remaining MCA branches are patent.    Communication: The summary of above findings were discussed with readback   confirmation with ANIA Thorne by radiologist Dr. Montemayor on 4/14/2022 at 5:33   PM.    --- End of Report ---            JOYCE MONTEMAYOR MD; Attending Radiologist  This document has been electronically signed. Apr 14 2022  5:42PM (04-14-22 @ 17:17)    PHYSICAL EXAM:  GEN: No acute distress  HEENT: Normocephalic  NECK: Supple  LUNGS: Decreased breathe sounds  HEART: Regular  ABD: Soft, non-tender, non-distended.  EXT: NE/2+PP  NEURO: AAOX3  PSYCH: Mood is appropriate, following commands

## 2022-04-17 NOTE — PROGRESS NOTE ADULT - ASSESSMENT
70 y/o male with COPD, HTN and VICKI on CPAP, admitted with acute conffusion  CT Angio +ve for Abrupt occlusion in the inferior terminal branch of the left M2 MCA and CTP +ve for Perfusion imaging predicts a core infarct of 7 mL in the left temporal lobe within the left MCA territory  pt was out of window for TPA, but sent to [place] for thrombectomy, s/p  left MCA M2 branch, there's distal clots at M3 not able to be reached,   was found to have Afib with slow heart rate and PVCs on the monitor.  now converted to NSR    Impression   - Left MCA CVA s/p thrombectomy  - New onset Afib with SVR, now in NSR  - CHADSVASC of 4, started on Eliquis   -Right distal DVT  -HTN  -COPD/ VICKI on CPAP        Recommendations   -cont with telemetry monitor  -con't Eliquis  -avoid AV node blocking agents for now   encourage CPAP at night   No need for PPM at this time  MCOT prior to discharge  PLEASE notify EP of discharge planning (at least the morning of discharge day)

## 2022-04-17 NOTE — PROGRESS NOTE ADULT - TIME BILLING
patient converted  back to normal sinus rhythm  - continue anticoagulation  -Event monitor at discharge  -Follow up the patient

## 2022-04-17 NOTE — CHART NOTE - NSCHARTNOTEFT_GEN_A_CORE
ICU DOWNGRADE NOTE:    71y Male transferred to floor from ICU    Patient is a 71y old Male who presents with a chief complaint of aphasia (17 Apr 2022 13:46)    The patient is currently admitted for the primary diagnosis of Acute right MCA stroke    Disposition: Stroke U  Code Status: Full    Follow Up:  - PT/rehab    HPI:  72 y/o male with COPD, HTN and VICKI on CPAP was brought for evaluation of confusion,    the pt was doing well today, he dropped his GF to her work, later after he arrived home he felt headache, took motrin with no improvement,   at 3pm, he drove and  his GF from the wrok and told her he has headache and not feeling well, after 30 min he was not able  to remember things including his GF name, then he had truobkle finding words and was making non comprehnsive talk,     pt not on antiplatelets or blood thinner, no hx of fever, previous CVA, or CAD, no cough, CP, recent travel, urianry symptoms, seizure or syncope.  in ER [facility], stroke code called for confusion, aphasia and dysarthria, CTH negative but CT Angio +ve for Abrupt occlusion in the inferior terminal branch of the left M2 MCA  and CTP +ve for Perfusion imaging predicts a core infarct of 7 mL in the left temporal   lobe within the left MCA territory.  pt was out of window for TPA, but sent to [place] for thrombectomy, s/p  left MCA M2 branch, per NE there's distal clots at M3 not able to be reached, pt was intubated during procedure  and successfully extubated, currently pt follows simple commands, more awake and alert, still have mild dysarthria and aphasia        (14 Apr 2022 21:25)      ICU COURSE OF EVENTS:  -------------------------------------------------------------------------------------------  Admitted for MCA stroke. Patient diagnosed with new onset a fib with RVR. Currently in NSR. Likely cause of ischemic stroke is a fib. Also found to have DVT. Started on therapeutic eliquis for DVT. MR cancelled as pt claustrophobic. Will get it done as OP   -------------------------------------------------------------------------------------------      Assessment and Plan:  72 y/o male with COPD, HTN and VICKI on CPAP was brought for evaluation of confusion,    # CVA with Left MCA infarction s/p thrombectomy  - neurocheck q4h, activity increase as tolerated, downgrade to stepdown or stroke unit  - EKG shows atrial fibrillation; currently on Heparin gtt Target PTT 50-60  - PT/OT/Speech/language eval  - Neuroendovascular f/u  - SBP goal <180  - Echo - EF 64%; no valvular abnormalities  - CTH stable. Stated on acute DVT eliquis therapy    # Afib  # Bradycardia  # HTN  - EP following  - Avoid AV joseph blocking agents  - Dopamine Gtt if needed for persistent bradycardia  - Stated on acute DVT eliquis therapy  - SBP goal <180    # distal DVT: Right post tibial  - Started on acute DVT eliquis therapy    COPD  VICKI   - CPAP at night  - No wheezing  - symbicort and albuterol  - s/p one dose of methylprednisone     Diet: regular  DVT pro: eliquis  GI pro: Famotidine  Dispo: stepdown/stroke U    SIGN OUT AT 04-17-22 @ 17:13 GIVEN TO: ICU DOWNGRADE NOTE:    71y Male transferred to floor from ICU    Patient is a 71y old Male who presents with a chief complaint of aphasia (17 Apr 2022 13:46)    The patient is currently admitted for the primary diagnosis of Acute right MCA stroke    Disposition: Stroke U  Code Status: Full    Follow Up:  - PT/rehab    HPI:  72 y/o male with COPD, HTN and VICKI on CPAP was brought for evaluation of confusion,    the pt was doing well today, he dropped his GF to her work, later after he arrived home he felt headache, took motrin with no improvement,   at 3pm, he drove and  his GF from the wrok and told her he has headache and not feeling well, after 30 min he was not able  to remember things including his GF name, then he had truobkle finding words and was making non comprehnsive talk,     pt not on antiplatelets or blood thinner, no hx of fever, previous CVA, or CAD, no cough, CP, recent travel, urianry symptoms, seizure or syncope.  in ER [facility], stroke code called for confusion, aphasia and dysarthria, CTH negative but CT Angio +ve for Abrupt occlusion in the inferior terminal branch of the left M2 MCA  and CTP +ve for Perfusion imaging predicts a core infarct of 7 mL in the left temporal   lobe within the left MCA territory.  pt was out of window for TPA, but sent to [place] for thrombectomy, s/p  left MCA M2 branch, per NE there's distal clots at M3 not able to be reached, pt was intubated during procedure  and successfully extubated, currently pt follows simple commands, more awake and alert, still have mild dysarthria and aphasia        (14 Apr 2022 21:25)      ICU COURSE OF EVENTS:  -------------------------------------------------------------------------------------------  Admitted for MCA stroke. Patient diagnosed with new onset a fib with RVR. Currently in NSR. Likely cause of ischemic stroke is a fib. Also found to have DVT. Started on therapeutic eliquis for DVT. MR cancelled as pt claustrophobic. Will get it done as OP   -------------------------------------------------------------------------------------------      Assessment and Plan:  72 y/o male with COPD, HTN and VICKI on CPAP was brought for evaluation of confusion,    # CVA with Left MCA infarction s/p thrombectomy  - neurocheck q4h, activity increase as tolerated, downgrade to stepdown or stroke unit  - EKG shows atrial fibrillation; currently on Heparin gtt Target PTT 50-60  - PT/OT/Speech/language eval  - Neuroendovascular f/u  - SBP goal <180  - Echo - EF 64%; no valvular abnormalities  - CTH stable. Stated on acute DVT eliquis therapy    # Afib  # Bradycardia  # HTN  - EP following  - Avoid AV joseph blocking agents  - Dopamine Gtt if needed for persistent bradycardia  - Stated on acute DVT eliquis therapy  - SBP goal <180    # distal DVT: Right post tibial  - Started on acute DVT eliquis therapy    COPD  VICKI   - CPAP at night  - No wheezing  - symbicort and albuterol  - s/p one dose of methylprednisone     Diet: regular  DVT pro: eliquis  GI pro: Famotidine  Dispo: stepdown/stroke U    SIGN OUT AT 04-17-22 @ 17:13 GIVEN TO: Dr. Lind

## 2022-04-17 NOTE — PROGRESS NOTE ADULT - ASSESSMENT
70 y/o male with COPD, HTN and VICKI on CPAP was brought for evaluation of confusion,    Neuro  - CVA with Left MCA infarction s/p thrombectomy  - neurocheck q4h, activity increase as tolerated, downgrade to stepdown or stroke unit  - EKG shows atrial fibrillation; currently on Heparin gtt Target PTT 50-60  - PT/OT/Speech/language eval  - Neuroendovascular f/u  - SBP goal <180  - Echo - EF 64%; no valvular abnormalities  - CTH stable. Stated on acute DVT eliquis therapy    Pulm  COPD  VICKI   - CPAP at night  - No wheezing  - symbicort and albuterol  - s/p one dose of methylprednisone     CV  Afib  Bradycardia  - EP following  - Avoid AV jospeh blocking agents  - Dopamine Gtt if needed for persistent bradycardia  - Stated on acute DVT eliquis therapy  - SBP goal <180    heme  distal DVT: Right post tibial  - Started on acute DVT eliquis therapy    Diet: regular  DVT pro: eliquis  GI pro: Famotidine  Dispo: stepdown/stroke U

## 2022-04-17 NOTE — PROGRESS NOTE ADULT - CRITICAL CARE ATTENDING COMMENT
71 year old gentleman presented with distal L MCA (inferior div M2) syndrome, underwent IMS, with some clinical improvement in aphasia. Require ICU care for frequent neurochecks, BP monitoring/optimization, peripheral pulsse checks and monitoring arterial puncture side.  Found to have transient A Fib, controlled ventricular response    Rest of assessment and plan of care as outlined above
Problems  Acute ischemic stroke, (L M2 inf div) improving aphasia, change neurochecks to q 4h  New onset A Fib, now converted to NSR spontaneously: long term A/C for secondary prophylaxis  DVT: on Eliquis    Rest of assessment and plan of care as outlined above.
Attending comments: 71 year old gentleman presented with distal L MCA (inferior div M2) syndrome, underwent IMS, with some clinical improvement in aphasia. Require ICU care for frequent neurochecks, BP monitoring/optimization, peripheral pulsse checks and monitoring arterial puncture side.  Found to have transient A Fib, controlled ventricular response.   +DVT, started on IV heparin since yesterday, repeat CT stable with therapeutic APTT, switched to eliquis    Rest of assessment and plan of care as outlined above.

## 2022-04-18 ENCOUNTER — TRANSCRIPTION ENCOUNTER (OUTPATIENT)
Age: 72
End: 2022-04-18

## 2022-04-18 VITALS
HEART RATE: 73 BPM | OXYGEN SATURATION: 94 % | DIASTOLIC BLOOD PRESSURE: 71 MMHG | SYSTOLIC BLOOD PRESSURE: 134 MMHG | RESPIRATION RATE: 20 BRPM

## 2022-04-18 LAB
ALBUMIN SERPL ELPH-MCNC: 4 G/DL — SIGNIFICANT CHANGE UP (ref 3.5–5.2)
ALP SERPL-CCNC: 77 U/L — SIGNIFICANT CHANGE UP (ref 30–115)
ALT FLD-CCNC: 36 U/L — SIGNIFICANT CHANGE UP (ref 0–41)
ANION GAP SERPL CALC-SCNC: 14 MMOL/L — SIGNIFICANT CHANGE UP (ref 7–14)
AST SERPL-CCNC: 29 U/L — SIGNIFICANT CHANGE UP (ref 0–41)
BASOPHILS # BLD AUTO: 0.03 K/UL — SIGNIFICANT CHANGE UP (ref 0–0.2)
BASOPHILS NFR BLD AUTO: 0.3 % — SIGNIFICANT CHANGE UP (ref 0–1)
BILIRUB SERPL-MCNC: 0.7 MG/DL — SIGNIFICANT CHANGE UP (ref 0.2–1.2)
BUN SERPL-MCNC: 14 MG/DL — SIGNIFICANT CHANGE UP (ref 10–20)
CALCIUM SERPL-MCNC: 8.5 MG/DL — SIGNIFICANT CHANGE UP (ref 8.5–10.1)
CHLORIDE SERPL-SCNC: 101 MMOL/L — SIGNIFICANT CHANGE UP (ref 98–110)
CO2 SERPL-SCNC: 23 MMOL/L — SIGNIFICANT CHANGE UP (ref 17–32)
CREAT SERPL-MCNC: 0.8 MG/DL — SIGNIFICANT CHANGE UP (ref 0.7–1.5)
EGFR: 96 ML/MIN/1.73M2 — SIGNIFICANT CHANGE UP
EOSINOPHIL # BLD AUTO: 0.22 K/UL — SIGNIFICANT CHANGE UP (ref 0–0.7)
EOSINOPHIL NFR BLD AUTO: 2.2 % — SIGNIFICANT CHANGE UP (ref 0–8)
FSP PPP-MCNC: >=5 <20 UG/ML
GLUCOSE SERPL-MCNC: 165 MG/DL — HIGH (ref 70–99)
HCT VFR BLD CALC: 39.8 % — LOW (ref 42–52)
HGB BLD-MCNC: 13.1 G/DL — LOW (ref 14–18)
IMM GRANULOCYTES NFR BLD AUTO: 0.7 % — HIGH (ref 0.1–0.3)
LYMPHOCYTES # BLD AUTO: 1.56 K/UL — SIGNIFICANT CHANGE UP (ref 1.2–3.4)
LYMPHOCYTES # BLD AUTO: 15.7 % — LOW (ref 20.5–51.1)
MAGNESIUM SERPL-MCNC: 1.9 MG/DL — SIGNIFICANT CHANGE UP (ref 1.8–2.4)
MCHC RBC-ENTMCNC: 30.5 PG — SIGNIFICANT CHANGE UP (ref 27–31)
MCHC RBC-ENTMCNC: 32.9 G/DL — SIGNIFICANT CHANGE UP (ref 32–37)
MCV RBC AUTO: 92.8 FL — SIGNIFICANT CHANGE UP (ref 80–94)
MONOCYTES # BLD AUTO: 0.74 K/UL — HIGH (ref 0.1–0.6)
MONOCYTES NFR BLD AUTO: 7.4 % — SIGNIFICANT CHANGE UP (ref 1.7–9.3)
NEUTROPHILS # BLD AUTO: 7.32 K/UL — HIGH (ref 1.4–6.5)
NEUTROPHILS NFR BLD AUTO: 73.7 % — SIGNIFICANT CHANGE UP (ref 42.2–75.2)
NRBC # BLD: 0 /100 WBCS — SIGNIFICANT CHANGE UP (ref 0–0)
PLATELET # BLD AUTO: 213 K/UL — SIGNIFICANT CHANGE UP (ref 130–400)
POTASSIUM SERPL-MCNC: 3.7 MMOL/L — SIGNIFICANT CHANGE UP (ref 3.5–5)
POTASSIUM SERPL-SCNC: 3.7 MMOL/L — SIGNIFICANT CHANGE UP (ref 3.5–5)
PROT SERPL-MCNC: 6.3 G/DL — SIGNIFICANT CHANGE UP (ref 6–8)
RBC # BLD: 4.29 M/UL — LOW (ref 4.7–6.1)
RBC # FLD: 13.4 % — SIGNIFICANT CHANGE UP (ref 11.5–14.5)
SODIUM SERPL-SCNC: 138 MMOL/L — SIGNIFICANT CHANGE UP (ref 135–146)
WBC # BLD: 9.94 K/UL — SIGNIFICANT CHANGE UP (ref 4.8–10.8)
WBC # FLD AUTO: 9.94 K/UL — SIGNIFICANT CHANGE UP (ref 4.8–10.8)

## 2022-04-18 PROCEDURE — 99232 SBSQ HOSP IP/OBS MODERATE 35: CPT

## 2022-04-18 PROCEDURE — 99238 HOSP IP/OBS DSCHRG MGMT 30/<: CPT

## 2022-04-18 RX ORDER — ATORVASTATIN CALCIUM 80 MG/1
1 TABLET, FILM COATED ORAL
Qty: 30 | Refills: 3
Start: 2022-04-18 | End: 2022-08-15

## 2022-04-18 RX ORDER — BUDESONIDE AND FORMOTEROL FUMARATE DIHYDRATE 160; 4.5 UG/1; UG/1
2 AEROSOL RESPIRATORY (INHALATION)
Qty: 1 | Refills: 0
Start: 2022-04-18 | End: 2022-05-17

## 2022-04-18 RX ORDER — ATORVASTATIN CALCIUM 80 MG/1
1 TABLET, FILM COATED ORAL
Qty: 30 | Refills: 2
Start: 2022-04-18 | End: 2022-07-16

## 2022-04-18 RX ORDER — FAMOTIDINE 10 MG/ML
1 INJECTION INTRAVENOUS
Qty: 14 | Refills: 0
Start: 2022-04-18 | End: 2022-05-01

## 2022-04-18 RX ORDER — APIXABAN 2.5 MG/1
2 TABLET, FILM COATED ORAL
Qty: 28 | Refills: 0
Start: 2022-04-18 | End: 2022-04-24

## 2022-04-18 RX ORDER — IPRATROPIUM/ALBUTEROL SULFATE 18-103MCG
3 AEROSOL WITH ADAPTER (GRAM) INHALATION
Qty: 360 | Refills: 1
Start: 2022-04-18 | End: 2022-06-16

## 2022-04-18 RX ORDER — IPRATROPIUM/ALBUTEROL SULFATE 18-103MCG
3 AEROSOL WITH ADAPTER (GRAM) INHALATION
Qty: 90 | Refills: 0
Start: 2022-04-18 | End: 2022-05-17

## 2022-04-18 RX ORDER — APIXABAN 2.5 MG/1
2 TABLET, FILM COATED ORAL
Qty: 120 | Refills: 2
Start: 2022-04-18 | End: 2022-07-16

## 2022-04-18 RX ORDER — BUDESONIDE AND FORMOTEROL FUMARATE DIHYDRATE 160; 4.5 UG/1; UG/1
2 AEROSOL RESPIRATORY (INHALATION)
Qty: 0 | Refills: 0 | DISCHARGE

## 2022-04-18 RX ADMIN — FAMOTIDINE 20 MILLIGRAM(S): 10 INJECTION INTRAVENOUS at 11:09

## 2022-04-18 RX ADMIN — Medication 650 MILLIGRAM(S): at 03:13

## 2022-04-18 RX ADMIN — APIXABAN 10 MILLIGRAM(S): 2.5 TABLET, FILM COATED ORAL at 05:23

## 2022-04-18 RX ADMIN — Medication 3 MILLILITER(S): at 15:00

## 2022-04-18 RX ADMIN — CHLORHEXIDINE GLUCONATE 1 APPLICATION(S): 213 SOLUTION TOPICAL at 06:22

## 2022-04-18 RX ADMIN — BUDESONIDE AND FORMOTEROL FUMARATE DIHYDRATE 2 PUFF(S): 160; 4.5 AEROSOL RESPIRATORY (INHALATION) at 11:03

## 2022-04-18 RX ADMIN — Medication 3 MILLILITER(S): at 08:39

## 2022-04-18 NOTE — OCCUPATIONAL THERAPY INITIAL EVALUATION ADULT - PERTINENT HX OF CURRENT PROBLEM, REHAB EVAL
Pt is a right hand dominant male admitted 4/14 with confusion, aphasia and dysarthria. Pt noted with occlusion in L MCA and infarct in L temporal lobe.  Pt is s/p emergent thrombectomy (4/14)

## 2022-04-18 NOTE — PROGRESS NOTE ADULT - ASSESSMENT
70 y/o male with COPD, HTN and VICKI on CPAP was brought for evaluation of confusion, trouble speech    # Cardioembolic ischemic stroke secondary to Afib.   patient had neuroendovascular mechanical thrombectomy on 4/14.   continue eliquis  management per stroke/neurology team; case discussed - plan for discharge  follow up with stroke clinic    #  RLE DVT  # new onset A fib- currently in sinus rhythm  # sleep apnea  Continue eliquis 10mg BID x 7 days then 5mg BID  Follow up with cardiac EP- for MCOT monitoring  Follow up for monitoring of sleep apnea    # borderline DM- A1c- 6.3  Carb controlled DASH diet discussed  Follow up with PCP    # HCV infection  F/u with GI     # COPD-stable  continue home medications

## 2022-04-18 NOTE — OCCUPATIONAL THERAPY INITIAL EVALUATION ADULT - GENERAL OBSERVATIONS, REHAB EVAL
Pt encountered and left semi reclined in bed with RN at bedside. +tele, +IV locked per RN during IE, +pulse ox. /73 mmHg. Pt seen for OT IE. Pt presented with expressive language deficits, mild dysarthria and word finding deficits. Pt independent with assessed ADLs and functional transfers. Discussed reccommendation for shower chair s/p dc for safety. No stregnth, sensation, or coordination deficits noted. No need for acute OT intervention at this time. Please re-consult if any change in functional status.

## 2022-04-18 NOTE — DISCHARGE NOTE NURSING/CASE MANAGEMENT/SOCIAL WORK - NSDCPEFALRISK_GEN_ALL_CORE
For information on Fall & Injury Prevention, visit: [email or web address] OR  [email or web address] OR  https://www.cdc.gov/steadi/patient.html

## 2022-04-18 NOTE — DISCHARGE NOTE PROVIDER - CARE PROVIDER_API CALL
Bj Mercado; KASSIDY)  [place] [address]  Phone: [phone]  Fax: [phone]  Follow Up Time: 2 weeks    Josi Zambrano)  Neurology  [address] [place] [address]  Phone: [phone]  Fax: [phone]  Follow Up Time: 2 weeks

## 2022-04-18 NOTE — DISCHARGE NOTE PROVIDER - HOSPITAL COURSE
72 y/o male with COPD, HTN and VICKI on CPAP was brought for evaluation of confusion,    the pt was doing well today, he dropped his GF to her work, later after he arrived home he felt headache, took motrin with no improvement,   at 3pm, he drove and  his GF from the work and told her he has headache and not feeling well, after 30 min he was not able  to remember things including his GF name, then he had trouble finding words and was making non comprehensive talk,     pt not on antiplatelets or blood thinner, no hx of fever, previous CVA, or CAD, no cough, CP, recent travel, urinary symptoms, seizure or syncope.  [place] stroke code called for confusion, aphasia and dysarthria, CTH negative but CT Angio +ve for Abrupt occlusion in the inferior terminal branch of the left M2 MCA  and CTP +ve for Perfusion imaging predicts a core infarct of 7 mL in the left temporal   lobe within the left MCA territory.  pt was out of window for TPA, but sent to [place] for thrombectomy, s/p  left MCA M2 branch, per NE there's distal clots at M3 not able to be reached, pt was intubated during procedure  and successfully extubated.   Exam on final visit is non focal except mild dysarthria.     Echo - EF 64%; no valvular abnormalities  distal DVT: Right post tibial, Started on acute DVT Eliquis therapy    < from: CT Head No Cont (04.15.22 @ 19:22) >      IMPRESSION:    A 2 x 2 cm zone of hypodensity is noted in the left posterior temporal   region (3/14) compatible with an area of infarction.    No evidence of acute intracranial hemorrhage or midline shift.    --- End of Report ---      < end of copied text >    Cardioembolic ischemic stroke secondary to Afib.   Patient also HCV positive recommended f/u GI.   Follow up with cardiologist EPS for MCOT as outpatient.        72 y/o male with COPD, HTN and VICKI on CPAP was brought for evaluation of confusion,    the pt was doing well today, he dropped his GF to her work, later after he arrived home he felt headache, took motrin with no improvement,   at 3pm, he drove and  his GF from the work and told her he has headache and not feeling well, after 30 min he was not able  to remember things including his GF name, then he had trouble finding words and was making non comprehensive talk,     pt not on antiplatelets or blood thinner, no hx of fever, previous CVA, or CAD, no cough, CP, recent travel, urinary symptoms, seizure or syncope.  [place], stroke code called for confusion, aphasia and dysarthria, CTH negative but CT Angio +ve for Abrupt occlusion in the inferior terminal branch of the left M2 MCA  and CTP +ve for Perfusion imaging predicts a core infarct of 7 mL in the left temporal   lobe within the left MCA territory.  pt was out of window for TPA, but sent to [place] for thrombectomy, s/p  left MCA M2 branch, per NE there's distal clots at M3 not able to be reached, pt was intubated during procedure  and successfully extubated. EP on board, called for final eval, no response, ptn will f/u outpatient.  Exam on final visit is non focal except mild dysarthria.     Echo - EF 64%; no valvular abnormalities  distal DVT: Right post tibial, Started on acute DVT Eliquis therapy    < from: CT Head No Cont (04.15.22 @ 19:22) >      IMPRESSION:    A 2 x 2 cm zone of hypodensity is noted in the left posterior temporal   region (3/14) compatible with an area of infarction.    No evidence of acute intracranial hemorrhage or midline shift.    --- End of Report ---      < end of copied text >    Cardioembolic ischemic stroke secondary to Afib.   Patient also HCV positive recommended f/u GI.   Follow up with cardiologist EPS for MCOT as outpatient.        70 y/o male with COPD, HTN and VICKI on CPAP was brought for evaluation of confusion,    the pt was doing well today, he dropped his GF to her work, later after he arrived home he felt headache, took motrin with no improvement,   at 3pm, he drove and  his GF from the work and told her he has headache and not feeling well, after 30 min he was not able  to remember things including his GF name, then he had trouble finding words and was making non comprehensive talk,     pt not on antiplatelets or blood thinner, no hx of fever, previous CVA, or CAD, no cough, CP, recent travel, urinary symptoms, seizure or syncope.  [place], stroke code called for confusion, aphasia and dysarthria, CTH negative but CT Angio +ve for Abrupt occlusion in the inferior terminal branch of the left M2 MCA  and CTP +ve for Perfusion imaging predicts a core infarct of 7 mL in the left temporal   lobe within the left MCA territory.  pt was out of window for TPA, but sent to [place] for thrombectomy, s/p  left MCA M2 branch, per NE there's distal clots at M3 not able to be reached, pt was intubated during procedure  and successfully extubated. EP on board, called for final eval, no response, ptn will f/u outpatient.  Exam on final visit is non focal except mild dysarthria.     Echo - EF 64%; no valvular abnormalities  distal DVT: Right post tibial, Started on acute DVT Eliquis therapy    < from: CT Head No Cont (04.15.22 @ 19:22) >      IMPRESSION:    A 2 x 2 cm zone of hypodensity is noted in the left posterior temporal   region (3/14) compatible with an area of infarction.    No evidence of acute intracranial hemorrhage or midline shift.    --- End of Report ---      < end of copied text >    Cardioembolic ischemic stroke secondary to Afib.   Patient also HCV positive recommended f/u GI.   Follow up with cardiologist EPS for MCOT as outpatient.       Stroke Attending Attestation:  Pt is a 70 yo M with PMHx of COPD, HTN, VICKI on CPAP, who presented with aphasia. S/p EVT of L M2 ELVO, TICI 3. NIHSS 0 today, no aphasia on exam (mild lisp). Found to have new onset afib.     Impr: acute ischemic stroke of left temporal lobe   Etiology: cardioembolic 2/2 new onset afib  Also with RLE DVT  Continue eliquis 10mg BID x 7 days then 5mg BID  F/u with GI for HCV+  F/u with cardiology for MCOT  F/u in stroke clinic in 2-3 weeks

## 2022-04-18 NOTE — DISCHARGE NOTE PROVIDER - NSDCCPCAREPLAN_GEN_ALL_CORE_FT
PRINCIPAL DISCHARGE DIAGNOSIS  Diagnosis: Acute right MCA stroke  Assessment and Plan of Treatment: During this hospital admission, you had an ischemic stroke. During an ischemic stroke, blood stops flowing to part of your brain because of a blockage in the blood vessel. This can damage areas in the brain that control other parts of the body.  Please take your Eliquis 10mg twice a day for 7 days then 5mg twice a day for blood thinning and Atorvastatin for cholesterol medication/blood vessel protection as prescribed to prevent further strokes. Do not skip doses and do not run low on your medication. If you run low on your medication, please contact your doctor.  You will follow up outpatient with the stroke Nurse Practitioner as scheduled below.  Doing your regular tasks may be difficult after you've had a stroke, but you can learn new ways to manage your daily activities. In fact, doing daily activities may help you to regain muscle strength. Be patient, give yourself time to adjust, and appreciate the progress you make. For example, when showering or bathing, test the water temperature with a hand or foot that was not affected by the stroke, use grab bars, a shower seat, a hand-held showerhead, etc. It is normal to feel fatigue after a stroke, while some days may be worse than others, you will continue to improve.  Call 911 right away if you have any of the following symptoms of another stroke:  B: Balance: Sudden: Dizziness, loss of balance, or a sense of falling, difficulty with coordinating movement  E: Eyes: Sudden double vision or trouble seeing in one or both eyes  F: Face: Sudden uneven face  A: Arms (Legs): Sudden weakness, tingling, or loss of feeling on one side of your face or body  S: Speech: Sudden trouble talking or slurred speech, sudden difficulty understanding others  T: Time: Please call 911 right away and go to the emergency room  •Sudden, severe headache  •Blackouts or seizures      SECONDARY DISCHARGE DIAGNOSES  Diagnosis: HCV (hepatitis C virus)  Assessment and Plan of Treatment: Please follow up with GI doctor and follow up the test results for hepatitis C.

## 2022-04-18 NOTE — DISCHARGE NOTE PROVIDER - PROVIDER TOKENS
PROVIDER:[TOKEN:[[id number]],FOLLOWUP:[2 weeks]],PROVIDER:[TOKEN:[[id number]],FOLLOWUP:[2 weeks]]

## 2022-04-18 NOTE — PROGRESS NOTE ADULT - SUBJECTIVE AND OBJECTIVE BOX
HPI  Patient is a 71y old Male who presents with a chief complaint of aphasia (18 Apr 2022 12:29)    Currently admitted to medicine with the primary diagnosis of Acute right MCA stroke       Today is hospital day 4d.     INTERVAL HPI / OVERNIGHT EVENTS:  Patient was seen and examined at bedside  girl friend at bedside  speech is better; but still has some difficulty with thoughts  Denies any complains of chest pain or shortness of breath  Denies any abdominal pain/nausea/vomiting        PAST MEDICAL & SURGICAL HISTORY  VICKI on CPAP      ALLERGIES  No Known Allergies    MEDICATIONS  STANDING MEDICATIONS  albuterol/ipratropium for Nebulization 3 milliLiter(s) Nebulizer every 6 hours  apixaban 10 milliGRAM(s) Oral every 12 hours  atorvastatin 80 milliGRAM(s) Oral at bedtime  budesonide 160 MICROgram(s)/formoterol 4.5 MICROgram(s) Inhaler 2 Puff(s) Inhalation two times a day  chlorhexidine 4% Liquid 1 Application(s) Topical <User Schedule>  famotidine    Tablet 20 milliGRAM(s) Oral daily  sodium chloride 0.9%. 1000 milliLiter(s) IV Continuous <Continuous>    PRN MEDICATIONS  acetaminophen     Tablet .. 650 milliGRAM(s) Oral every 6 hours PRN  guaiFENesin  milliGRAM(s) Oral every 12 hours PRN    VITALS:  T(F): 97.4  HR: 73  BP: 134/71  RR: 20  SpO2: 94%    PHYSICAL EXAM  GEN: no distress, comfortable  PULM: normal respiration  EXT: No lower extremity edema  NEURO: A&Ox3, moving all extremities    LABS                        13.1   9.94  )-----------( 213      ( 18 Apr 2022 04:30 )             39.8     04-18    138  |  101  |  14  ----------------------------<  165<H>  3.7   |  23  |  0.8    Ca    8.5      18 Apr 2022 04:30  Mg     1.9     04-18    TPro  6.3  /  Alb  4.0  /  TBili  0.7  /  DBili  x   /  AST  29  /  ALT  36  /  AlkPhos  77  04-18                  RADIOLOGY

## 2022-04-18 NOTE — OCCUPATIONAL THERAPY INITIAL EVALUATION ADULT - SHORT TERM MEMORY, REHAB EVAL
Pt able to repeat and recall 3 words following ~ 3 min delay. Pt declines any changes to STM. Pt able to recall details of events leading up to and during admission/intact

## 2022-04-18 NOTE — DISCHARGE NOTE PROVIDER - NSDCMRMEDTOKEN_GEN_ALL_CORE_FT
apixaban 5 mg oral tablet: 2 tab(s) orally every 12 hours  apixaban 5 mg oral tablet: 1 tab(s) orally 2 times a day  famotidine 20 mg oral tablet: 1 tab(s) orally once a day  guaiFENesin 600 mg oral tablet, extended release: 1 tab(s) orally prn, As Needed -Cough   ipratropium-albuterol 0.5 mg-2.5 mg/3 mL inhalation solution: 3 milliliter(s) inhaled every 6 hours  Lipitor 80 mg oral tablet: 1 tab(s) orally once a day (at bedtime)  Symbicort 160 mcg-4.5 mcg/inh inhalation aerosol: 2 puff(s) inhaled 2 times a day   Eliquis Starter Pack for Treatment of DVT and PE 5 mg oral tablet: 2 tab(s) orally every 12 hours  famotidine 20 mg oral tablet: 1 tab(s) orally once a day  guaiFENesin 600 mg oral tablet, extended release: 1 tab(s) orally prn, As Needed -Cough   ipratropium-albuterol 0.5 mg-2.5 mg/3 mL inhalation solution: 3 milliliter(s) inhaled every 6 hours  Lipitor 80 mg oral tablet: 1 tab(s) orally once a day (at bedtime)  Symbicort 160 mcg-4.5 mcg/inh inhalation aerosol: 2 puff(s) inhaled 2 times a day   atorvastatin 80 mg oral tablet: 1 tab(s) orally once a day (at bedtime)  budesonide-formoterol 160 mcg-4.5 mcg/inh inhalation aerosol: 2 puff(s) inhaled 2 times a day  Eliquis Starter Pack for Treatment of DVT and PE 5 mg oral tablet: 2 tab(s) orally every 12 hours  famotidine 20 mg oral tablet: 1 tab(s) orally once a day  guaiFENesin 600 mg oral tablet, extended release: 1 tab(s) orally every 12 hours, As needed, Cough  ipratropium-albuterol 0.5 mg-2.5 mg/3 mL inhalation solution: 3 milliliter(s) inhaled prn

## 2022-04-18 NOTE — DISCHARGE NOTE NURSING/CASE MANAGEMENT/SOCIAL WORK - PATIENT PORTAL LINK FT
You can access the FollowMyHealth Patient Portal offered by [facility] by registering at the following website: [email or web address]. By joining [facility]’s FollowMyHealth portal, you will also be able to view your health information using other applications (apps) compatible with our system.

## 2022-04-19 PROBLEM — Z00.00 ENCOUNTER FOR PREVENTIVE HEALTH EXAMINATION: Status: ACTIVE | Noted: 2022-04-19

## 2022-04-20 PROBLEM — G47.33 OBSTRUCTIVE SLEEP APNEA (ADULT) (PEDIATRIC): Chronic | Status: ACTIVE | Noted: 2022-04-14

## 2022-04-20 LAB — HCV RNA FLD QL NAA+PROBE: SIGNIFICANT CHANGE UP

## 2022-04-22 ENCOUNTER — NON-APPOINTMENT (OUTPATIENT)
Age: 72
End: 2022-04-22

## 2022-04-25 RX ORDER — APIXABAN 2.5 MG/1
1 TABLET, FILM COATED ORAL
Qty: 60 | Refills: 3
Start: 2022-04-25 | End: 2022-08-22

## 2022-04-26 DIAGNOSIS — I63.511 CEREBRAL INFARCTION DUE TO UNSPECIFIED OCCLUSION OR STENOSIS OF RIGHT MIDDLE CEREBRAL ARTERY: ICD-10-CM

## 2022-04-26 DIAGNOSIS — B19.20 UNSPECIFIED VIRAL HEPATITIS C WITHOUT HEPATIC COMA: ICD-10-CM

## 2022-04-26 DIAGNOSIS — R29.705 NIHSS SCORE 5: ICD-10-CM

## 2022-04-26 DIAGNOSIS — R73.03 PREDIABETES: ICD-10-CM

## 2022-04-26 DIAGNOSIS — I69.320 APHASIA FOLLOWING CEREBRAL INFARCTION: ICD-10-CM

## 2022-04-26 DIAGNOSIS — R47.1 DYSARTHRIA AND ANARTHRIA: ICD-10-CM

## 2022-04-26 DIAGNOSIS — I82.441 ACUTE EMBOLISM AND THROMBOSIS OF RIGHT TIBIAL VEIN: ICD-10-CM

## 2022-04-26 DIAGNOSIS — I48.91 UNSPECIFIED ATRIAL FIBRILLATION: ICD-10-CM

## 2022-04-26 DIAGNOSIS — J44.9 CHRONIC OBSTRUCTIVE PULMONARY DISEASE, UNSPECIFIED: ICD-10-CM

## 2022-04-26 DIAGNOSIS — I82.4Z1 ACUTE EMBOLISM AND THROMBOSIS OF UNSPECIFIED DEEP VEINS OF RIGHT DISTAL LOWER EXTREMITY: ICD-10-CM

## 2022-04-26 DIAGNOSIS — G47.33 OBSTRUCTIVE SLEEP APNEA (ADULT) (PEDIATRIC): ICD-10-CM

## 2022-04-26 DIAGNOSIS — Z20.822 CONTACT WITH AND (SUSPECTED) EXPOSURE TO COVID-19: ICD-10-CM

## 2022-04-27 ENCOUNTER — TRANSCRIPTION ENCOUNTER (OUTPATIENT)
Age: 72
End: 2022-04-27

## 2022-04-28 ENCOUNTER — TRANSCRIPTION ENCOUNTER (OUTPATIENT)
Age: 72
End: 2022-04-28

## 2022-05-07 ENCOUNTER — INPATIENT (INPATIENT)
Facility: HOSPITAL | Age: 72
LOS: 0 days | Discharge: HOME | End: 2022-05-08
Attending: FAMILY MEDICINE | Admitting: FAMILY MEDICINE
Payer: MEDICARE

## 2022-05-07 VITALS
SYSTOLIC BLOOD PRESSURE: 142 MMHG | HEIGHT: 65 IN | WEIGHT: 160.06 LBS | DIASTOLIC BLOOD PRESSURE: 96 MMHG | HEART RATE: 79 BPM | RESPIRATION RATE: 18 BRPM | OXYGEN SATURATION: 100 % | TEMPERATURE: 97 F

## 2022-05-07 LAB
ALBUMIN SERPL ELPH-MCNC: 4.1 G/DL — SIGNIFICANT CHANGE UP (ref 3.5–5.2)
ALP SERPL-CCNC: 102 U/L — SIGNIFICANT CHANGE UP (ref 30–115)
ALT FLD-CCNC: 31 U/L — SIGNIFICANT CHANGE UP (ref 0–41)
ANION GAP SERPL CALC-SCNC: 13 MMOL/L — SIGNIFICANT CHANGE UP (ref 7–14)
APPEARANCE UR: CLEAR — SIGNIFICANT CHANGE UP
AST SERPL-CCNC: 26 U/L — SIGNIFICANT CHANGE UP (ref 0–41)
BACTERIA # UR AUTO: ABNORMAL
BASOPHILS # BLD AUTO: 0.03 K/UL — SIGNIFICANT CHANGE UP (ref 0–0.2)
BASOPHILS NFR BLD AUTO: 0.3 % — SIGNIFICANT CHANGE UP (ref 0–1)
BILIRUB DIRECT SERPL-MCNC: 0.2 MG/DL — SIGNIFICANT CHANGE UP (ref 0–0.3)
BILIRUB INDIRECT FLD-MCNC: 0.5 MG/DL — SIGNIFICANT CHANGE UP (ref 0.2–1.2)
BILIRUB SERPL-MCNC: 0.7 MG/DL — SIGNIFICANT CHANGE UP (ref 0.2–1.2)
BILIRUB UR-MCNC: NEGATIVE — SIGNIFICANT CHANGE UP
BLD GP AB SCN SERPL QL: SIGNIFICANT CHANGE UP
BUN SERPL-MCNC: 16 MG/DL — SIGNIFICANT CHANGE UP (ref 10–20)
CALCIUM SERPL-MCNC: 9.2 MG/DL — SIGNIFICANT CHANGE UP (ref 8.5–10.1)
CHLORIDE SERPL-SCNC: 102 MMOL/L — SIGNIFICANT CHANGE UP (ref 98–110)
CO2 SERPL-SCNC: 25 MMOL/L — SIGNIFICANT CHANGE UP (ref 17–32)
COD CRY URNS QL: NEGATIVE — SIGNIFICANT CHANGE UP
COLOR SPEC: YELLOW — SIGNIFICANT CHANGE UP
COMMENT - URINE: SIGNIFICANT CHANGE UP
CREAT SERPL-MCNC: 0.9 MG/DL — SIGNIFICANT CHANGE UP (ref 0.7–1.5)
DIFF PNL FLD: ABNORMAL
EGFR: 92 ML/MIN/1.73M2 — SIGNIFICANT CHANGE UP
EOSINOPHIL # BLD AUTO: 0.16 K/UL — SIGNIFICANT CHANGE UP (ref 0–0.7)
EOSINOPHIL NFR BLD AUTO: 1.6 % — SIGNIFICANT CHANGE UP (ref 0–8)
EPI CELLS # UR: ABNORMAL /HPF
GLUCOSE SERPL-MCNC: 144 MG/DL — HIGH (ref 70–99)
GLUCOSE UR QL: NEGATIVE MG/DL — SIGNIFICANT CHANGE UP
GRAN CASTS # UR COMP ASSIST: NEGATIVE — SIGNIFICANT CHANGE UP
HCT VFR BLD CALC: 44.3 % — SIGNIFICANT CHANGE UP (ref 42–52)
HGB BLD-MCNC: 14.5 G/DL — SIGNIFICANT CHANGE UP (ref 14–18)
HYALINE CASTS # UR AUTO: NEGATIVE — SIGNIFICANT CHANGE UP
IMM GRANULOCYTES NFR BLD AUTO: 0.4 % — HIGH (ref 0.1–0.3)
KETONES UR-MCNC: ABNORMAL
LACTATE SERPL-SCNC: 2.2 MMOL/L — HIGH (ref 0.7–2)
LACTATE SERPL-SCNC: 2.5 MMOL/L — HIGH (ref 0.7–2)
LEUKOCYTE ESTERASE UR-ACNC: NEGATIVE — SIGNIFICANT CHANGE UP
LIDOCAIN IGE QN: 38 U/L — SIGNIFICANT CHANGE UP (ref 7–60)
LYMPHOCYTES # BLD AUTO: 2.15 K/UL — SIGNIFICANT CHANGE UP (ref 1.2–3.4)
LYMPHOCYTES # BLD AUTO: 21.6 % — SIGNIFICANT CHANGE UP (ref 20.5–51.1)
MCHC RBC-ENTMCNC: 29.9 PG — SIGNIFICANT CHANGE UP (ref 27–31)
MCHC RBC-ENTMCNC: 32.7 G/DL — SIGNIFICANT CHANGE UP (ref 32–37)
MCV RBC AUTO: 91.3 FL — SIGNIFICANT CHANGE UP (ref 80–94)
MONOCYTES # BLD AUTO: 1.04 K/UL — HIGH (ref 0.1–0.6)
MONOCYTES NFR BLD AUTO: 10.5 % — HIGH (ref 1.7–9.3)
NEUTROPHILS # BLD AUTO: 6.53 K/UL — HIGH (ref 1.4–6.5)
NEUTROPHILS NFR BLD AUTO: 65.6 % — SIGNIFICANT CHANGE UP (ref 42.2–75.2)
NITRITE UR-MCNC: NEGATIVE — SIGNIFICANT CHANGE UP
NRBC # BLD: 0 /100 WBCS — SIGNIFICANT CHANGE UP (ref 0–0)
PH UR: 6 — SIGNIFICANT CHANGE UP (ref 5–8)
PLATELET # BLD AUTO: 243 K/UL — SIGNIFICANT CHANGE UP (ref 130–400)
POTASSIUM SERPL-MCNC: 4.1 MMOL/L — SIGNIFICANT CHANGE UP (ref 3.5–5)
POTASSIUM SERPL-SCNC: 4.1 MMOL/L — SIGNIFICANT CHANGE UP (ref 3.5–5)
PROT SERPL-MCNC: 6.8 G/DL — SIGNIFICANT CHANGE UP (ref 6–8)
PROT UR-MCNC: 100 MG/DL
RBC # BLD: 4.85 M/UL — SIGNIFICANT CHANGE UP (ref 4.7–6.1)
RBC # FLD: 12.9 % — SIGNIFICANT CHANGE UP (ref 11.5–14.5)
RBC CASTS # UR COMP ASSIST: ABNORMAL /HPF
SARS-COV-2 RNA SPEC QL NAA+PROBE: DETECTED
SODIUM SERPL-SCNC: 140 MMOL/L — SIGNIFICANT CHANGE UP (ref 135–146)
SP GR SPEC: 1.02 — SIGNIFICANT CHANGE UP (ref 1.01–1.03)
TRI-PHOS CRY UR QL COMP ASSIST: NEGATIVE — SIGNIFICANT CHANGE UP
TROPONIN T SERPL-MCNC: <0.01 NG/ML — SIGNIFICANT CHANGE UP
URATE CRY FLD QL MICRO: NEGATIVE — SIGNIFICANT CHANGE UP
UROBILINOGEN FLD QL: 0.2 MG/DL — SIGNIFICANT CHANGE UP
WBC # BLD: 9.95 K/UL — SIGNIFICANT CHANGE UP (ref 4.8–10.8)
WBC # FLD AUTO: 9.95 K/UL — SIGNIFICANT CHANGE UP (ref 4.8–10.8)
WBC UR QL: SIGNIFICANT CHANGE UP /HPF

## 2022-05-07 PROCEDURE — 99222 1ST HOSP IP/OBS MODERATE 55: CPT

## 2022-05-07 PROCEDURE — 74177 CT ABD & PELVIS W/CONTRAST: CPT | Mod: 26,MA

## 2022-05-07 PROCEDURE — 99285 EMERGENCY DEPT VISIT HI MDM: CPT

## 2022-05-07 PROCEDURE — 99221 1ST HOSP IP/OBS SF/LOW 40: CPT

## 2022-05-07 PROCEDURE — 93010 ELECTROCARDIOGRAM REPORT: CPT

## 2022-05-07 RX ORDER — MORPHINE SULFATE 50 MG/1
4 CAPSULE, EXTENDED RELEASE ORAL ONCE
Refills: 0 | Status: DISCONTINUED | OUTPATIENT
Start: 2022-05-07 | End: 2022-05-07

## 2022-05-07 RX ORDER — SODIUM CHLORIDE 9 MG/ML
1000 INJECTION INTRAMUSCULAR; INTRAVENOUS; SUBCUTANEOUS
Refills: 0 | Status: DISCONTINUED | OUTPATIENT
Start: 2022-05-07 | End: 2022-05-08

## 2022-05-07 RX ORDER — HEPARIN SODIUM 5000 [USP'U]/ML
5000 INJECTION INTRAVENOUS; SUBCUTANEOUS EVERY 8 HOURS
Refills: 0 | Status: DISCONTINUED | OUTPATIENT
Start: 2022-05-07 | End: 2022-05-08

## 2022-05-07 RX ORDER — KETOROLAC TROMETHAMINE 30 MG/ML
30 SYRINGE (ML) INJECTION ONCE
Refills: 0 | Status: DISCONTINUED | OUTPATIENT
Start: 2022-05-07 | End: 2022-05-07

## 2022-05-07 RX ORDER — FAMOTIDINE 10 MG/ML
20 INJECTION INTRAVENOUS DAILY
Refills: 0 | Status: DISCONTINUED | OUTPATIENT
Start: 2022-05-07 | End: 2022-05-08

## 2022-05-07 RX ORDER — ACETAMINOPHEN 500 MG
650 TABLET ORAL EVERY 6 HOURS
Refills: 0 | Status: DISCONTINUED | OUTPATIENT
Start: 2022-05-07 | End: 2022-05-08

## 2022-05-07 RX ORDER — MORPHINE SULFATE 50 MG/1
2 CAPSULE, EXTENDED RELEASE ORAL EVERY 6 HOURS
Refills: 0 | Status: DISCONTINUED | OUTPATIENT
Start: 2022-05-07 | End: 2022-05-08

## 2022-05-07 RX ORDER — SODIUM CHLORIDE 9 MG/ML
1000 INJECTION INTRAMUSCULAR; INTRAVENOUS; SUBCUTANEOUS ONCE
Refills: 0 | Status: COMPLETED | OUTPATIENT
Start: 2022-05-07 | End: 2022-05-07

## 2022-05-07 RX ORDER — ATORVASTATIN CALCIUM 80 MG/1
80 TABLET, FILM COATED ORAL AT BEDTIME
Refills: 0 | Status: DISCONTINUED | OUTPATIENT
Start: 2022-05-07 | End: 2022-05-08

## 2022-05-07 RX ORDER — CHLORHEXIDINE GLUCONATE 213 G/1000ML
1 SOLUTION TOPICAL
Refills: 0 | Status: DISCONTINUED | OUTPATIENT
Start: 2022-05-07 | End: 2022-05-08

## 2022-05-07 RX ORDER — BUDESONIDE AND FORMOTEROL FUMARATE DIHYDRATE 160; 4.5 UG/1; UG/1
2 AEROSOL RESPIRATORY (INHALATION)
Refills: 0 | Status: DISCONTINUED | OUTPATIENT
Start: 2022-05-07 | End: 2022-05-08

## 2022-05-07 RX ORDER — ALBUTEROL 90 UG/1
2 AEROSOL, METERED ORAL EVERY 6 HOURS
Refills: 0 | Status: DISCONTINUED | OUTPATIENT
Start: 2022-05-07 | End: 2022-05-08

## 2022-05-07 RX ADMIN — SODIUM CHLORIDE 1000 MILLILITER(S): 9 INJECTION INTRAMUSCULAR; INTRAVENOUS; SUBCUTANEOUS at 11:01

## 2022-05-07 RX ADMIN — SODIUM CHLORIDE 100 MILLILITER(S): 9 INJECTION INTRAMUSCULAR; INTRAVENOUS; SUBCUTANEOUS at 22:30

## 2022-05-07 RX ADMIN — SODIUM CHLORIDE 1000 MILLILITER(S): 9 INJECTION INTRAMUSCULAR; INTRAVENOUS; SUBCUTANEOUS at 15:40

## 2022-05-07 RX ADMIN — SODIUM CHLORIDE 100 MILLILITER(S): 9 INJECTION INTRAMUSCULAR; INTRAVENOUS; SUBCUTANEOUS at 19:53

## 2022-05-07 RX ADMIN — SODIUM CHLORIDE 1000 MILLILITER(S): 9 INJECTION INTRAMUSCULAR; INTRAVENOUS; SUBCUTANEOUS at 09:57

## 2022-05-07 RX ADMIN — Medication 30 MILLIGRAM(S): at 09:57

## 2022-05-07 RX ADMIN — MORPHINE SULFATE 4 MILLIGRAM(S): 50 CAPSULE, EXTENDED RELEASE ORAL at 13:28

## 2022-05-07 RX ADMIN — BUDESONIDE AND FORMOTEROL FUMARATE DIHYDRATE 2 PUFF(S): 160; 4.5 AEROSOL RESPIRATORY (INHALATION) at 22:51

## 2022-05-07 RX ADMIN — HEPARIN SODIUM 5000 UNIT(S): 5000 INJECTION INTRAVENOUS; SUBCUTANEOUS at 22:51

## 2022-05-07 RX ADMIN — Medication 30 MILLIGRAM(S): at 19:10

## 2022-05-07 RX ADMIN — ATORVASTATIN CALCIUM 80 MILLIGRAM(S): 80 TABLET, FILM COATED ORAL at 22:51

## 2022-05-07 RX ADMIN — MORPHINE SULFATE 4 MILLIGRAM(S): 50 CAPSULE, EXTENDED RELEASE ORAL at 19:10

## 2022-05-07 NOTE — H&P ADULT - NSHPLABSRESULTS_GEN_ALL_CORE
14.5   9.95  )-----------( 243      ( 07 May 2022 09:57 )             44.3           140  |  102  |  16  ----------------------------<  144<H>  4.1   |  25  |  0.9    Ca    9.2      07 May 2022 09:57    TPro  6.8  /  Alb  4.1  /  TBili  0.7  /  DBili  0.2  /  AST  26  /  ALT  31  /  AlkPhos  102  05-07                  Urinalysis Basic - ( 07 May 2022 12:00 )    Color: Yellow / Appearance: Clear / S.020 / pH: x  Gluc: x / Ketone: Trace  / Bili: Negative / Urobili: 0.2 mg/dL   Blood: x / Protein: 100 mg/dL / Nitrite: Negative   Leuk Esterase: Negative / RBC: 26-50 /HPF / WBC 3-5 /HPF   Sq Epi: x / Non Sq Epi: Few /HPF / Bacteria: Moderate      Lactate Trend   @ 09:57 Lactate:2.5       CARDIAC MARKERS ( 07 May 2022 09:57 )  x     / <0.01 ng/mL / x     / x     / x          < from: CT Abdomen and Pelvis w/ IV Cont (22 @ 10:31) >    IMPRESSION:    7 x 5 x 5 mm right proximal ureteral calculus (1127 Hounsfield units)   with associated mild right perinephric stranding and hydronephrosis.    Approximately 4.7 x 4.4 x 4.5 cm left upper pole mass likely renal cell   carcinoma. Furtherfollow up/evaluation per urology recommendations is   recommended.    Multiple new small right lower lobe nodules measuring less than 7 mm may   be infectious/inflammatory and less likely metastatic, however metastatic   disease cannot be excluded. Short-term follow-up in 3 months is   recommended.    Spoke with TOM PRUETT MD on 2022 11:04 AM with readback.    --- End of Report ---      CHRISTOFER BAI MD; Attending Radiologist  This document has been electronically signed. May  7 2022 11:06AM    < end of copied text >

## 2022-05-07 NOTE — ED PROVIDER NOTE - NS ED ATTENDING STATEMENT MOD
This was a shared visit with the ALPA. I reviewed and verified the documentation and independently performed the documented:

## 2022-05-07 NOTE — ED PROVIDER NOTE - PHYSICAL EXAMINATION
Physical Exam    Vital Signs: I have reviewed the initial vital signs.  Constitutional: nontoxic apeparing, uncomfortable from pain  Eyes: Conjunctiva pink, Sclera clear,   Cardiovascular: S1 and S2, regular rate, regular rhythm, well-perfused extremities, radial pulses equal and 2+, calves nonttp, equal in size  Respiratory: unlabored respiratory effort, speaking in full sentences, handling oral secretions,  clear to auscultation bilaterally no wheezing, rales and rhonchi  Gastrointestinal: soft, non-tender abdomen, no pulsatile mass, normal bowl sounds, + R CVAT   Musculoskeletal: supple neck, no lower extremity edema  Integumentary: warm, diaphoretic no rashes, lacerations,  Neurologic: awake, alert, cranial nerves II-XII grossly intact, extremities’ motor and sensory functions grossly intact, no nystagmus, tremors, no aphasia,  fasciculations facial droop

## 2022-05-07 NOTE — H&P ADULT - NSHPPHYSICALEXAM_GEN_ALL_CORE
VITALS:   ICU Vital Signs Last 24 Hrs  T(C): 36.1 (07 May 2022 09:27), Max: 36.1 (07 May 2022 09:27)  T(F): 97 (07 May 2022 09:27), Max: 97 (07 May 2022 09:27)  HR: 79 (07 May 2022 09:27) (79 - 79)  BP: 142/96 (07 May 2022 09:27) (142/96 - 142/96)  RR: 18 (07 May 2022 09:27) (18 - 18)  SpO2: 100% (07 May 2022 09:27) (100% - 100%)      GENERAL: NAD, lying in bed comfortably  HEAD:  Atraumatic, Normocephalic  EYES: EOMI, PERRLA, conjunctiva and sclera clear  ENT: Moist mucous membranes  NECK: Supple, No JVD  CHEST/LUNG: Clear to auscultation bilaterally; No rales, rhonchi, wheezing, or rubs. Unlabored respirations  HEART: Regular rate and rhythm; No murmurs, rubs, or gallops  ABDOMEN: obese, Soft, Nontender, Nondistended. No hepatomegally  Back : right flank CVA tenderness   EXTREMITIES:  no edema or tenderness   NERVOUS SYSTEM:  Alert & Oriented X3, speech clear. No deficits   MSK: FROM all 4 extremities, full and equal strength  SKIN: No rashes or lesions

## 2022-05-07 NOTE — H&P ADULT - ASSESSMENT
A 72 y/o M with PMHx of COVID (+) for  past 10 days, COPD,  VICKI on CPAP, HLD,  GERD,  HCV+, with recent admission 4/14 --4/18s s/p acute ischemic stroke of left temporal lobe, s/p T ICI 3 recanalization of the inferior division of M2 on the left-hand side from thrombotic occlusion 4/14, cardioembolic 2/2 new onset afib, and also with RLE DVT (currently on Eliquis 5 mg BID) -- now presents with right flank pain which began this AM.      # Obstructing right proximal ureteral stone with hydro  # renal colic pain   - IV fluids  -pain meds  -NPO after midnight   -urology consult appreciated - possible stent placement tomorrow  -monitor for fever, if develops fever  will need urgent stent placement  -am labs  -trend lactate at 7 pm   -active type and screen   -coags PT/PTT/INR    #incidental finding  Left renal mass  -oupt follow up with urology for partial vs total nephrectomy     #Multiple new small right lower lobe nodules measuring less than 7 mm may   be infectious/inflammatory and less likely metastatic  -oupt follow up with hematology     #  RLE DVT  # chronic A fib  -will hold  Eliquis 5 for now   -scd       # sleep apnea  -continue Cpap at night   -monitor pule ox       #HLD  -continue statin     #hx  HCV infection  F/u with GI     # chronic  COPD  -continue home medications    #GERD  -continue pepcid       A 72 y/o M with PMHx of COVID (+) for  past 10 days, COPD,  VICKI on CPAP, HLD,  GERD,  HCV+, with recent admission 4/14 --4/18s s/p acute ischemic stroke of left temporal lobe, s/p T ICI 3 recanalization of the inferior division of M2 on the left-hand side from thrombotic occlusion 4/14, cardioembolic 2/2 new onset afib, and also with RLE DVT (currently on Eliquis 5 mg BID) -- now presents with right flank pain which began this AM.      # Obstructing right proximal ureteral stone with hydro  # renal colic pain   - IV fluids  -pain meds  -NPO after midnight   -urology consult appreciated - possible stent placement tomorrow  -monitor for fever, if develops fever  will need urgent stent placement  -am labs  -trend lactate at 7 pm   -active type and screen   -coags PT/PTT/INR    #incidental finding  Left renal mass  -oupt follow up with urology for partial vs total nephrectomy     #Multiple new small right lower lobe nodules measuring less than 7 mm may   be infectious/inflammatory and less likely metastatic  -oupt follow up with hematology       #COVID positive  -isolation precaution     #  RLE DVT  # chronic A fib  -will hold  Eliquis 5 for now         # sleep apnea  -continue Cpap at night   -monitor pule ox       #HLD  -continue statin     #hx  HCV infection  F/u with GI     # chronic  COPD  -continue home medications    #GERD  -continue pepcid       A 70 y/o M with PMHx of COVID (+) for  past 10 days, COPD,  VICKI on CPAP, HLD,  GERD,  HCV+, with recent admission 4/14 --4/18s s/p acute ischemic stroke of left temporal lobe, s/p T ICI 3 recanalization of the inferior division of M2 on the left-hand side from thrombotic occlusion 4/14, cardioembolic 2/2 new onset afib, and also with RLE DVT (currently on Eliquis 5 mg BID) -- now presents with right flank pain which began this AM.      # Obstructing right proximal ureteral stone with hydro  # renal colic pain   - IV fluids  -pain meds  -NPO after midnight   -urology consult appreciated - possible stent placement tomorrow  -monitor for fever, if develops fever  will need urgent stent placement  -am labs  -neurology and cardiology clearance for OR  -trend lactate at 7 pm   -active type and screen   -coags PT/PTT/INR    #incidental finding  Left renal mass  -oupt follow up with urology for partial vs total nephrectomy     #Multiple new small right lower lobe nodules measuring less than 7 mm may   be infectious/inflammatory and less likely metastatic  -oupt follow up with hematology       #COVID positive  -isolation precaution     #  RLE DVT  # chronic A fib  -will hold  Eliquis 5 for now         # sleep apnea  -continue Cpap at night   -monitor pule ox       #HLD  -continue statin     #hx  HCV infection  F/u with GI     # chronic  COPD  -continue home medications    #GERD  -continue pepcid       A 72 y/o M with PMHx of COVID (+) for  past 10 days, COPD,  VICKI on CPAP, HLD,  GERD,  HCV+, with recent admission 4/14 --4/18s s/p acute ischemic stroke of left temporal lobe, s/p T ICI 3 recanalization of the inferior division of M2 on the left-hand side from thrombotic occlusion 4/14, cardioembolic 2/2 new onset afib, and also with RLE DVT (currently on Eliquis 5 mg BID) -- now presents with right flank pain which began this AM.      # Obstructing right proximal ureteral stone with hydro  # renal colic pain   - IV fluids  -pain meds  -NPO after midnight   -urology consult appreciated - possible stent placement tomorrow  -monitor for fever, if develops fever  will need urgent stent placement  -am labs  -neurology and cardiology clearance for OR  -trend lactate at 7 pm   -active type and screen   -coags PT/PTT/INR    #incidental finding  Left renal mass  -oupt follow up with urology for partial vs total nephrectomy     #Multiple new small right lower lobe nodules measuring less than 7 mm may   be infectious/inflammatory and less likely metastatic  -oupt follow up with pulmonary and oncology      #COVID positive  - pt previously had covid recently  -isolation precaution as per infection control    #  RLE DVT  # chronic A fib  -will hold  Eliquis 5 for now   - rate controlled    # sleep apnea  -continue Cpap at night   -monitor pule ox     #HLD  -continue statin     #hx  HCV infection  F/u with GI OP    # chronic  COPD  -continue home medications    #GERD  -continue pepcid

## 2022-05-07 NOTE — ED PROVIDER NOTE - COVID-19 ORDERING FACILITY
Review of Systems/Medical History  Patient summary reviewed  Chart reviewed      Cardiovascular  Negative cardio ROS    Pulmonary  Asthma Asthma type of rescue: PRN nebulizer,        GI/Hepatic  Negative GI/hepatic ROS          Negative  ROS        Endo/Other  Diabetes type 2 ,   Obesity    GYN       Hematology   Musculoskeletal  Osteoarthritis,   Arthritis     Neurology    Headaches,    Psychology   Anxiety, Depression ,              Physical Exam    Airway    Mallampati score: II  TM Distance: >3 FB  Neck ROM: full     Dental       Cardiovascular  Comment: Negative ROS, Rhythm: regular, Rate: normal, Cardiovascular exam normal    Pulmonary  Pulmonary exam normal     Other Findings        Anesthesia Plan  ASA Score- 3     Anesthesia Type- general with ASA Monitors  Additional Monitors:   Airway Plan:         Plan Factors- Patient instructed to abstain from smoking on day of procedure  Patient did not smoke on day of surgery  Induction- intravenous  Postoperative Plan-     Informed Consent- Anesthetic plan and risks discussed with patient 
[facility]

## 2022-05-07 NOTE — ED PROVIDER NOTE - CLINICAL SUMMARY MEDICAL DECISION MAKING FREE TEXT BOX
Pt presents with right flank pain, assoc nausea, found to have a 7 mm stone right side. Also incidentally found is a mass on the right kidney. This was explained to the patient and wife. Will ask urology to see pt.

## 2022-05-07 NOTE — CONSULT NOTE ADULT - SUBJECTIVE AND OBJECTIVE BOX
HPI:  A 70 y/o M with PMHx of COVID (+) for  past 10 days, COPD,  VICKI on CPAP, HCV+, with recent admission 4/14 --4/18s s/p acute ischemic stroke of left temporal lobe, s/p T ICI 3 recanalization of the inferior division of M2 on the left-hand side from thrombotic occlusion 4/14, cardioembolic 2/2 new onset afib, and also with RLE DVT (currently on Eliquis 5 mg BID) -- now presents with right flank pain which began this AM. Pt reports started having right flank pain while driving at 8 am. PT reports pain sharp, 8/10 initially constant , now intermittent . Pt states pain radiating to groin. PT states first time having flank pain. PT on Eliquis took 5 mg this am. Pt denies nausea or vomiting. PT denies fever or chills. Pt denies frequent urination, hematuria, chest pain, shortness of breath, burning with urination, constipation or diarrhea.      (07 May 2022 15:31)        HPI-Cardiology   Pt with above Hx evaluated at bedside. Currently admitted for obstructing renal stone. Consulted for Cardiovascular Risk Stratification before cystoscopy. Pt states that he follows up with Dr Mercado and has sn [facility] on, and has an appointment with Dr Mercado on 5/27. Found to be in AFib, ruthie with PVC's and was started on Eliquis. Pt denies any CP, SOB at rest or exertion, denies any palpitations, dizziness or lightheadedness. Radiology tests and hospital records, were reviewed, as well as previous notes on this patient.       PAST MEDICAL & SURGICAL HISTORY  VICKI on CPAP    Cerebrovascular accident (CVA)      ALLERGIES:  No Known Allergies      MEDICATIONS:  MEDICATIONS  (STANDING):  atorvastatin 80 milliGRAM(s) Oral at bedtime  budesonide 160 MICROgram(s)/formoterol 4.5 MICROgram(s) Inhaler 2 Puff(s) Inhalation two times a day  chlorhexidine 4% Liquid 1 Application(s) Topical <User Schedule>  famotidine    Tablet 20 milliGRAM(s) Oral daily  heparin   Injectable 5000 Unit(s) SubCutaneous every 8 hours  sodium chloride 0.9%. 1000 milliLiter(s) (100 mL/Hr) IV Continuous <Continuous>    MEDICATIONS  (PRN):  acetaminophen     Tablet .. 650 milliGRAM(s) Oral every 6 hours PRN Temp greater or equal to 38C (100.4F), Mild Pain (1 - 3)  ALBUTerol    90 MICROgram(s) HFA Inhaler 2 Puff(s) Inhalation every 6 hours PRN Bronchospasm  morphine  - Injectable 2 milliGRAM(s) IV Push every 6 hours PRN Severe Pain (7 - 10)      HOME MEDICATIONS:  Home Medications:      VITALS:   T(F): 97.5 (05-07 @ 19:10), Max: 97.5 (05-07 @ 19:10)  HR: 81 (05-07 @ 19:10) (79 - 81)  BP: 137/77 (05-07 @ 19:10) (137/77 - 142/96)  BP(mean): --  RR: 18 (05-07 @ 19:10) (18 - 18)  SpO2: 96% (05-07 @ 19:10) (96% - 100%)          REVIEW OF SYSTEMS:  See HPI      PHYSICAL EXAM:  NEURO: patient is awake , alert and oriented  GEN: Not in acute distress  NECK: no thyroid enlargement, no JVD  LUNGS: Clear to auscultation bilaterally   CARDIOVASCULAR: S1/S2 present, Irregular rate and mormal rhythm , no murmurs or rubs, no carotid bruits,  + PP bilaterally  ABD: Soft, non-tender, non-distended, +BS  EXT: No ANGIE  SKIN: Intact    LABS:                        14.5   9.95  )-----------( 243      ( 07 May 2022 09:57 )             44.3     05-07    140  |  102  |  16  ----------------------------<  144<H>  4.1   |  25  |  0.9    Ca    9.2      07 May 2022 09:57    TPro  6.8  /  Alb  4.1  /  TBili  0.7  /  DBili  0.2  /  AST  26  /  ALT  31  /  AlkPhos  102  05-07      Lactate, Blood: 2.5 mmol/L *H* (05-07-22 @ 09:57)  Troponin T, Serum: <0.01 ng/mL (05-07-22 @ 09:57)    CARDIAC MARKERS ( 07 May 2022 09:57 )  x     / <0.01 ng/mL / x     / x     / x            04-16 Chol 154 LDL -- HDL 44 Trig 76      RADIOLOGY:  -CXR:  < from: CT Abdomen and Pelvis w/ IV Cont (05.07.22 @ 10:31) >    IMPRESSION:    7 x 5 x 5 mm right proximal ureteral calculus (1127 Hounsfield units)   with associated mild right perinephric stranding and hydronephrosis.    Approximately 4.7 x 4.4 x 4.5 cm left upper pole mass likely renal cell   carcinoma. Furtherfollow up/evaluation per urology recommendations is   recommended.    Multiple new small right lower lobe nodules measuring less than 7 mm may   be infectious/inflammatory and less likely metastatic, however metastatic   disease cannot be excluded. Short-term follow-up in 3 months is   recommended.    Spoke with TOM PRUETT MD on 5/7/2022 11:04 AM with readback.    --- End of Report ---      < end of copied text >        ECHO:  < from: TTE Echo Complete w/o Contrast w/ Doppler (04.15.22 @ 11:53) >      Summary:   1. Normal globalleft ventricular systolic function.   2. LV Ejection Fraction by Purcell's Method with a biplane EF of 64 %.   3. Normal left ventricular internal cavity size.   4. Normal left atrial size.   5. Normal right atrial size.   6. No evidence of mitral valve regurgitation.   7. Dilated ascending aorta to a diameter of 4.4 cm.   8. LA volume Index is 33.5 ml/m² ml/m2.    < end of copied text >      ECG:  < from: 12 Lead ECG (04.15.22 @ 01:43) >  Atrial fibrillation with premature ventricular or aberrantly conducted  complexes  Abnormal ECG    Confirmed by Barb Mueller MD ([id number]) on 4/18/2022 8:24:48 AM    < end of copied text >

## 2022-05-07 NOTE — CONSULT NOTE ADULT - NS ATTEND AMEND GEN_ALL_CORE FT
Pt seen and examined. CT reviewed by me. Will set up for stent as unremitting pain. Cardiol;ogviviana has cleared and have not been able to get neurology to see pt.  However risk exists from not proceeding due to significant pain causing issues. Will do under sedation to start with.  Discussed in detail with pt and family.
see above

## 2022-05-07 NOTE — ED PROVIDER NOTE - OBJECTIVE STATEMENT
71-year-old male past medical history of hypertension diabetes A. fib on Eliquis who recently underwent thrombectomy for CVA 3 weeks ago here for evaluation of right-sided flank pain since early this morning.  Took no medications for pain prior to arrival.  Patient cannot get comfortable.  No chest pain shortness of breath cough fever or chills no dysuria or hematuria no nausea vomiting or diarrhea.  No history of abdominal surgeries.  Is passing gas and had a bowel movement yesterday.

## 2022-05-07 NOTE — CONSULT NOTE ADULT - NSCONSULTADDITIONALINFOA_GEN_ALL_CORE
The patient was admitted with renal colic , right flank pain . He had cystoscopy and uretal stent this morning without complication. He has had a complicated month . The patient has had a CVA which was thought to be embolic . The patient had PAF but he also had a DVT as well. The patient has not had CP or SOB . There was a question as to the cause of his CVA . Cardioembolic from PAF vs paradoxical embolus from a DVT and PFT . I do not see a bubble study done or PETE previously . He had his eliquis stopped this morning for the procedure . This needs to be restarted as soon as possible ( tonight's dose) He has appointment with Dr. Villavicencio this week and was told of the need to keep it .

## 2022-05-07 NOTE — PATIENT PROFILE ADULT - FALL HARM RISK - HARM RISK INTERVENTIONS
Assistance with ambulation/Assistance OOB with selected safe patient handling equipment/Communicate Risk of Fall with Harm to all staff/Discuss with provider need for PT consult/Monitor gait and stability/Provide patient with walking aids - walker, cane, crutches/Reinforce activity limits and safety measures with patient and family/Sit up slowly, dangle for a short time, stand at bedside before walking/Tailored Fall Risk Interventions/Use of alarms - bed, chair and/or voice tab/Visual Cue: Yellow wristband and red socks/Bed in lowest position, wheels locked, appropriate side rails in place/Call bell, personal items and telephone in reach/Instruct patient to call for assistance before getting out of bed or chair/Non-slip footwear when patient is out of bed/[place] to call system/Physically safe environment - no spills, clutter or unnecessary equipment/Purposeful Proactive Rounding/Room/bathroom lighting operational, light cord in reach

## 2022-05-07 NOTE — CONSULT NOTE ADULT - NSCONSULTADDITIONALINFOA_GEN_ALL_CORE
Pt seen and examined. CT reviewed by me. Will set up for stent as unremitting pain. Cardiol;ogviviana has cleared and have not been able to get neurology to see pt.  However risk exists from not proceeding due to significant pain causing issues. Will do under sedation to start with.  Discussed in detail with pt and family.

## 2022-05-07 NOTE — ED PROVIDER NOTE - PROGRESS NOTE DETAILS
DC: US guided iv placed. Pt given 30 mg toradol IV and fluids. Labs drawn. Will CT. DC: pt comfortable, pain well controlled. Urology consulted for kidney stone. Incidental finding of renal mass discussed with pt and his  at bedside. Verbalizes understanding to follow up

## 2022-05-07 NOTE — H&P ADULT - NS ATTEND AMEND GEN_ALL_CORE FT
Pt examined bedside in the ED. Pain is currently controlled. Pt aware of possible procedure tomorrow re stent placement for obstructive ureteral stone with subsequent hydro.     - urology recs noted  - consult cardio and neuro as per urology for preop  - preop labs  - npo after midnight  - IVF and trend lactate till wnl  - bipap  - pain control with morphine prn pain 7-10 and tylenol for breakthrough pain  - avoid NSAIDs given upcoming procedure  - hold eliquis; can resume 24 hrs postop  - covid positive but pt had covid a few months prior so could be falsely positive from continued viral shedding

## 2022-05-07 NOTE — PATIENT PROFILE ADULT - NSPROGENPREVTRANSF_GEN_A_NUR
pt has expressive aphasia ALEX at this time pt has expressive aphasia ALEX at this time/no history of blood product transfusion

## 2022-05-07 NOTE — CONSULT NOTE ADULT - ASSESSMENT
70 y/o M with PMHx of COVID (+) for  past 10 days, COPD,  VICKI on CPAP, HCV+, with recent admission 4/14 --4/18s s/p acute ischemic stroke of left temporal lobe, s/p T ICI 3 recanalization of the inferior division of M2 on the left-hand side from thrombotic occlusion 4/14, cardioembolic 2/2 new onset afib, and also with RLE DVT (currently on Eliquis 5 mg BID) -- now presented with right flank pain which began this morning. Scheduled for cystoscopy for obstructing ureteral stone     #Cardiovascular Risk Stratification    ECG    -Currently no active cardiac conditions. No signs of ischemia, ADHF,  and clinical exam not consistent with stenotic valvular disease  - Able to walk >4 METS without any anginal symptoms  - Revised Cardiac Risk Index:  - Bernardo Perioperative score   - HR and BP acceptable  - Further cardiac workup will depend on clinical course  - All other workup per primary team. Will continue to follow.  70 y/o M with PMHx of COVID (+) for  past 10 days, COPD,  VICKI on CPAP, HCV+, with recent admission 4/14 --4/18s s/p acute ischemic stroke of left temporal lobe, s/p T ICI 3 recanalization of the inferior division of M2 on the left-hand side from thrombotic occlusion 4/14, cardioembolic 2/2 new onset afib, and also with RLE DVT (currently on Eliquis 5 mg BID) -- now presented with right flank pain which began this morning. Scheduled for cystoscopy for obstructing ureteral stone     #Cardiovascular Risk Stratification    Initial ECG on admission shows: Sinus with 1st degree AV block  ECHO(4/15/2022): EF 64%  Trop flat x1  Not on AV joseph blockers    -Currently no active cardiac conditions. No signs of ischemia, ADHF,  and clinical exam not consistent with stenotic valvular disease  - Able to walk >4 METS without any anginal symptoms  - Revised Cardiac Risk Index: 1 (6% risk of EMERALD) adelfo-op/post-op  - Bernardo Perioperative score: 0% risk of EMERALD adelfo-op/post-op  - CHADS-VASc: 4 - C/w Eliquis. Can Hold 24 hrs prior to procedure  - Low-moderate risk for adelfo-op/post-op cardiovascular complications  - Further cardiac workup will depend on clinical course  - All other workup per primary team    Discussed with Dr Prado

## 2022-05-07 NOTE — H&P ADULT - HISTORY OF PRESENT ILLNESS
A 72 y/o M with PMHx of COVID (+) for  past 10 days, COPD,  VICKI on CPAP, HCV+, with recent admission 4/14 --4/18s s/p acute ischemic stroke of left temporal lobe, s/p T ICI 3 recanalization of the inferior division of M2 on the left-hand side from thrombotic occlusion 4/14, cardioembolic 2/2 new onset afib, and also with RLE DVT (currently on Eliquis 5 mg BID) -- now presents with right flank pain which began this AM. Pt reports started having right flank pain while driving at 8 am. PT reports pain sharp, 8/10 initially constant , now intermittent . Pt states pain radiating to groin. PT states first time having flank pain. PT on Eliquis took 5 mg this am. Pt denies nausea or vomiting. PT denies fever or chills. Pt denies frequent urination, hematuria, chest pain, shortness of breath, burning with urination, constipation or diarrhea.

## 2022-05-07 NOTE — CONSULT NOTE ADULT - SUBJECTIVE AND OBJECTIVE BOX
Pt is a 70 yo M with PMHx of COPD, HTN, VICKI on CPAP, HCV+, with recent admission 4/14 --4/18 (presented with aphasia) and is s/p acute ischemic stroke of left temporal lobe, s/p T ICI 3 recanalization of the inferior division of M2 on the left-hand side from thrombotic occlusion 4/14. cardioembolic 2/2 new onset afib, and also with RLE DVT (currently on Eliquis) -- now presents with right flank pain x few days.         PAST MEDICAL & SURGICAL HISTORY:  VICKI on CPAP  Cerebrovascular accident (CVA)    Home Medications:        MEDICATIONS  (STANDING):    MEDICATIONS  (PRN):      Allergies  No Known Allergies    Social Hx:      REVIEW OF SYSTEMS  right flank pain    [ ] A ten-point review of systems was otherwise negative except as noted.  [ ] Due to altered mental status/intubation, subjective information were not able to be obtained from the patient. History was obtained, to the extent possible, from review of the chart and collateral sources of information.      Vital Signs Last 24 Hrs  T(C): 36.1 (07 May 2022 09:27), Max: 36.1 (07 May 2022 09:27)  T(F): 97 (07 May 2022 09:27), Max: 97 (07 May 2022 09:27)  HR: 79 (07 May 2022 09:27) (79 - 79)  BP: 142/96 (07 May 2022 09:27) (142/96 - 142/96)  BP(mean): --  RR: 18 (07 May 2022 09:27) (18 - 18)  SpO2: 100% (07 May 2022 09:27) (100% - 100%)    PHYSICAL EXAM:  GENERAL: NAD, well-appearing  CHEST/LUNG: Clear to auscultation bilaterally  HEART: Regular rate and rhythm  ABDOMEN: Soft, Nontender, Nondistended;   EXTREMITIES:  No clubbing, cyanosis, or edema      LABS:                        14.5   9.95  )-----------( 243      ( 07 May 2022 09:57 )             44.3       Auto Neutrophil %: 65.6 % (05-07-22 @ 09:57)  Auto Immature Granulocyte %: 0.4 % (05-07-22 @ 09:57)    05-07    140  |  102  |  16  ----------------------------<  144<H>  4.1   |  25  |  0.9      Calcium, Total Serum: 9.2 mg/dL (05-07-22 @ 09:57)      LFTs:             6.8  | 0.7  | 26       ------------------[102     ( 07 May 2022 09:57 )  4.1  | 0.2  | 31          Lipase:38     Amylase:x         Lactate, Blood: 2.5 mmol/L (05-07-22 @ 09:57)      Coags:    CARDIAC MARKERS ( 07 May 2022 09:57 )  x     / <0.01 ng/mL / x     / x     / x        RADIOLOGY & ADDITIONAL STUDIES:  CT Abdomen and Pelvis w/ IV Cont (05.07.22 @ 10:31) >  IMPRESSION:    7 x 5 x 5 mm right proximal ureteral calculus (1127 Hounsfield units)   with associated mild right perinephric stranding and hydronephrosis.    Approximately 4.7 x 4.4 x 4.5 cm left upper pole mass likely renal cell   carcinoma. Furtherfollow up/evaluation per urology recommendations is   recommended.    Multiple new small right lower lobe nodules measuring less than 7 mm may   be infectious/inflammatory and less likely metastatic, however metastatic   disease cannot be excluded. Short-term follow-up in 3 months is   recommended.    CT Head No Cont (04.16.22 @ 16:43) >  IMPRESSION:  No significant change since recent head CT of 4/15/2022 with evolving   acute left MCA territory infarct. No hemorrhagic transformation.    VA Duplex Lower Ext Vein Scan, Bilat (04.14.22 @ 21:52) >  IMPRESSION:  DVT in right posterior tibial vein.  Left lower extremity is negative for DVT.     Pt is a 70 yo M with PMHx of COPD,  VICKI on CPAP, HCV+, with recent admission 4/14 --4/18 (presented with aphasia) and is s/p acute ischemic stroke of left temporal lobe, s/p T ICI 3 recanalization of the inferior division of M2 on the left-hand side from thrombotic occlusion 4/14. cardioembolic 2/2 new onset afib, and also with RLE DVT (currently on Eliquis) -- now presents with right flank pain which began this AM. Denies N/V/hematuria/difficulty urinating. has never seen a urologist.      Pt also COVID+; fully vaccinated; had cough 10 days and tested positive at that time        PAST MEDICAL & SURGICAL HISTORY:  VICKI on CPAP  Cerebrovascular accident (CVA)  COPD  HCV  no prior surgeries    Home Medications:  Eliquis 5 mg BID  Lipitor  Pepcid  Breztri 2 pumps daily        MEDICATIONS  (STANDING):    MEDICATIONS  (PRN):      Allergies  No Known Allergies    Social Hx:  ex-tobacco (quit 17 yrs ago; smoke dfor 30 yrs)  denies tob/illicit drugs    employed part-time    REVIEW OF SYSTEMS  right flank pain    [ ] A ten-point review of systems was otherwise negative except as noted.  [ ] Due to altered mental status/intubation, subjective information were not able to be obtained from the patient. History was obtained, to the extent possible, from review of the chart and collateral sources of information.      Vital Signs Last 24 Hrs  T(C): 36.1 (07 May 2022 09:27), Max: 36.1 (07 May 2022 09:27)  T(F): 97 (07 May 2022 09:27), Max: 97 (07 May 2022 09:27)  HR: 79 (07 May 2022 09:27) (79 - 79)  BP: 142/96 (07 May 2022 09:27) (142/96 - 142/96)  BP(mean): --  RR: 18 (07 May 2022 09:27) (18 - 18)  SpO2: 100% (07 May 2022 09:27) (100% - 100%)    PHYSICAL EXAM:  GENERAL: NAD, well-appearing  CHEST/LUNG: Clear to auscultation bilaterally  HEART: Regular rate and rhythm  ABDOMEN: Soft, Nontender, Nondistended;   EXTREMITIES:  No clubbing, cyanosis, or edema      LABS:                        14.5   9.95  )-----------( 243      ( 07 May 2022 09:57 )             44.3       Auto Neutrophil %: 65.6 % (05-07-22 @ 09:57)  Auto Immature Granulocyte %: 0.4 % (05-07-22 @ 09:57)    05-07    140  |  102  |  16  ----------------------------<  144<H>  4.1   |  25  |  0.9      Calcium, Total Serum: 9.2 mg/dL (05-07-22 @ 09:57)      LFTs:             6.8  | 0.7  | 26       ------------------[102     ( 07 May 2022 09:57 )  4.1  | 0.2  | 31          Lipase:38     Amylase:x         Lactate, Blood: 2.5 mmol/L (05-07-22 @ 09:57)      Coags:    CARDIAC MARKERS ( 07 May 2022 09:57 )  x     / <0.01 ng/mL / x     / x     / x        RADIOLOGY & ADDITIONAL STUDIES:  CT Abdomen and Pelvis w/ IV Cont (05.07.22 @ 10:31) >  IMPRESSION:    7 x 5 x 5 mm right proximal ureteral calculus (1127 Hounsfield units)   with associated mild right perinephric stranding and hydronephrosis.    Approximately 4.7 x 4.4 x 4.5 cm left upper pole mass likely renal cell   carcinoma. Furtherfollow up/evaluation per urology recommendations is   recommended.    Multiple new small right lower lobe nodules measuring less than 7 mm may   be infectious/inflammatory and less likely metastatic, however metastatic   disease cannot be excluded. Short-term follow-up in 3 months is   recommended.    CT Head No Cont (04.16.22 @ 16:43) >  IMPRESSION:  No significant change since recent head CT of 4/15/2022 with evolving   acute left MCA territory infarct. No hemorrhagic transformation.    VA Duplex Lower Ext Vein Scan, Bilat (04.14.22 @ 21:52) >  IMPRESSION:  DVT in right posterior tibial vein.  Left lower extremity is negative for DVT.     Pt is a 72 yo M with PMHx of COPD,  VICKI on CPAP, HCV+, with recent admission 4/14 --4/18 (presented with aphasia) and is s/p acute ischemic stroke of left temporal lobe, s/p T ICI 3 recanalization of the inferior division of M2 on the left-hand side from thrombotic occlusion 4/14. cardioembolic 2/2 new onset afib, and also with RLE DVT (currently on Eliquis) -- now presents with right flank pain which began this AM. Denies N/V/hematuria/difficulty urinating. has never seen a urologist.      Pt also COVID+; fully vaccinated; had cough 10 days and tested positive at that time        PAST MEDICAL & SURGICAL HISTORY:  VICKI on CPAP  Cerebrovascular accident (CVA)  COPD  HCV  no prior surgeries    Home Medications:  Eliquis 5 mg BID  Lipitor  Pepcid  Breztri 2 pumps daily        MEDICATIONS  (STANDING):    MEDICATIONS  (PRN):      Allergies  No Known Allergies    Social Hx:  ex-tobacco (quit 17 yrs ago; smoke dfor 30 yrs)  denies tob/illicit drugs    employed part-time    REVIEW OF SYSTEMS  right flank pain    [ ] A ten-point review of systems was otherwise negative except as noted.  [ ] Due to altered mental status/intubation, subjective information were not able to be obtained from the patient. History was obtained, to the extent possible, from review of the chart and collateral sources of information.      Vital Signs Last 24 Hrs  T(C): 36.1 (07 May 2022 09:27), Max: 36.1 (07 May 2022 09:27)  T(F): 97 (07 May 2022 09:27), Max: 97 (07 May 2022 09:27)  HR: 79 (07 May 2022 09:27) (79 - 79)  BP: 142/96 (07 May 2022 09:27) (142/96 - 142/96)  BP(mean): --  RR: 18 (07 May 2022 09:27) (18 - 18)  SpO2: 100% (07 May 2022 09:27) (100% - 100%)    PHYSICAL EXAM:  GENERAL: NAD, well-appearing  CHEST/LUNG: CTA  HEART: S1S2  ABDOMEN: Soft, obese, +RCVA/flank tenderness       LABS:                        14.5   9.95  )-----------( 243      ( 07 May 2022 09:57 )             44.3       Auto Neutrophil %: 65.6 % (05-07-22 @ 09:57)  Auto Immature Granulocyte %: 0.4 % (05-07-22 @ 09:57)    05-07    140  |  102  |  16  ----------------------------<  144<H>  4.1   |  25  |  0.9      Calcium, Total Serum: 9.2 mg/dL (05-07-22 @ 09:57)      LFTs:             6.8  | 0.7  | 26       ------------------[102     ( 07 May 2022 09:57 )  4.1  | 0.2  | 31          Lipase:38     Amylase:x         Lactate, Blood: 2.5 mmol/L (05-07-22 @ 09:57)      Coags:    CARDIAC MARKERS ( 07 May 2022 09:57 )  x     / <0.01 ng/mL / x     / x     / x        RADIOLOGY & ADDITIONAL STUDIES:  CT Abdomen and Pelvis w/ IV Cont (05.07.22 @ 10:31) >  IMPRESSION:    7 x 5 x 5 mm right proximal ureteral calculus (1127 Hounsfield units)   with associated mild right perinephric stranding and hydronephrosis.    Approximately 4.7 x 4.4 x 4.5 cm left upper pole mass likely renal cell   carcinoma. Further follow up/evaluation per urology recommendations is   recommended.    Multiple new small right lower lobe nodules measuring less than 7 mm may   be infectious/inflammatory and less likely metastatic, however metastatic   disease cannot be excluded. Short-term follow-up in 3 months is   recommended.    CT Head No Cont (04.16.22 @ 16:43) >  IMPRESSION:  No significant change since recent head CT of 4/15/2022 with evolving   acute left MCA territory infarct. No hemorrhagic transformation.    VA Duplex Lower Ext Vein Scan, Bilat (04.14.22 @ 21:52) >  IMPRESSION:  DVT in right posterior tibial vein.  Left lower extremity is negative for DVT.

## 2022-05-07 NOTE — CONSULT NOTE ADULT - ASSESSMENT
Pt is a 70 yo M with PMHx of COPD,  VICKI on CPAP, HCV+, with recent admission 4/14 --4/18 (presented with aphasia) and is s/p acute ischemic stroke of left temporal lobe, s/p T ICI 3 recanalization of the inferior division of M2 on the left-hand side from thrombotic occlusion 4/14. cardioembolic 2/2 new onset afib, and also with RLE DVT (currently on Eliquis) -- now presents with right flank pain which began this AM. Denies N/V/hematuria/difficulty urinating. has never seen a urologist.      Case D/W Dr. Pruitt:    1. Obstructing right proximal ureteral stone with hydro  - recommend admit to medicine due to co-morbidities  - will tentatively book for stent placement tomorrow   - NPO P MN, pain management, IV fluids  - f/u am labs: currently with normal renal function  - can continue eliquis for now  - monitor for fevers: if develops, will need urgent stent placement    2. Left renal mass, r/o RCC   - no acute intervention - pt will F/U in office for further recommendations   - renal biopsy not necessarily needed as this is greater than 3 cm; aaron hernandez for partial vs total nephrectomy as outpt

## 2022-05-08 ENCOUNTER — TRANSCRIPTION ENCOUNTER (OUTPATIENT)
Age: 72
End: 2022-05-08

## 2022-05-08 VITALS
HEART RATE: 65 BPM | SYSTOLIC BLOOD PRESSURE: 133 MMHG | DIASTOLIC BLOOD PRESSURE: 79 MMHG | OXYGEN SATURATION: 95 % | RESPIRATION RATE: 16 BRPM | TEMPERATURE: 98 F

## 2022-05-08 LAB
ALBUMIN SERPL ELPH-MCNC: 3.8 G/DL — SIGNIFICANT CHANGE UP (ref 3.5–5.2)
ALP SERPL-CCNC: 91 U/L — SIGNIFICANT CHANGE UP (ref 30–115)
ALT FLD-CCNC: 26 U/L — SIGNIFICANT CHANGE UP (ref 0–41)
ANION GAP SERPL CALC-SCNC: 11 MMOL/L — SIGNIFICANT CHANGE UP (ref 7–14)
APTT BLD: 35.8 SEC — SIGNIFICANT CHANGE UP (ref 27–39.2)
AST SERPL-CCNC: 22 U/L — SIGNIFICANT CHANGE UP (ref 0–41)
BILIRUB SERPL-MCNC: 0.7 MG/DL — SIGNIFICANT CHANGE UP (ref 0.2–1.2)
BUN SERPL-MCNC: 11 MG/DL — SIGNIFICANT CHANGE UP (ref 10–20)
CALCIUM SERPL-MCNC: 8.8 MG/DL — SIGNIFICANT CHANGE UP (ref 8.5–10.1)
CHLORIDE SERPL-SCNC: 104 MMOL/L — SIGNIFICANT CHANGE UP (ref 98–110)
CO2 SERPL-SCNC: 24 MMOL/L — SIGNIFICANT CHANGE UP (ref 17–32)
CREAT SERPL-MCNC: 0.7 MG/DL — SIGNIFICANT CHANGE UP (ref 0.7–1.5)
CULTURE RESULTS: SIGNIFICANT CHANGE UP
EGFR: 100 ML/MIN/1.73M2 — SIGNIFICANT CHANGE UP
GLUCOSE SERPL-MCNC: 110 MG/DL — HIGH (ref 70–99)
HCT VFR BLD CALC: 44.1 % — SIGNIFICANT CHANGE UP (ref 42–52)
HGB BLD-MCNC: 14.7 G/DL — SIGNIFICANT CHANGE UP (ref 14–18)
INR BLD: 1.23 RATIO — SIGNIFICANT CHANGE UP (ref 0.65–1.3)
MCHC RBC-ENTMCNC: 30.4 PG — SIGNIFICANT CHANGE UP (ref 27–31)
MCHC RBC-ENTMCNC: 33.3 G/DL — SIGNIFICANT CHANGE UP (ref 32–37)
MCV RBC AUTO: 91.3 FL — SIGNIFICANT CHANGE UP (ref 80–94)
NRBC # BLD: 0 /100 WBCS — SIGNIFICANT CHANGE UP (ref 0–0)
PLATELET # BLD AUTO: 217 K/UL — SIGNIFICANT CHANGE UP (ref 130–400)
POTASSIUM SERPL-MCNC: 4.2 MMOL/L — SIGNIFICANT CHANGE UP (ref 3.5–5)
POTASSIUM SERPL-SCNC: 4.2 MMOL/L — SIGNIFICANT CHANGE UP (ref 3.5–5)
PROT SERPL-MCNC: 6.4 G/DL — SIGNIFICANT CHANGE UP (ref 6–8)
PROTHROM AB SERPL-ACNC: 14.1 SEC — HIGH (ref 9.95–12.87)
RBC # BLD: 4.83 M/UL — SIGNIFICANT CHANGE UP (ref 4.7–6.1)
RBC # FLD: 13.2 % — SIGNIFICANT CHANGE UP (ref 11.5–14.5)
SODIUM SERPL-SCNC: 139 MMOL/L — SIGNIFICANT CHANGE UP (ref 135–146)
SPECIMEN SOURCE: SIGNIFICANT CHANGE UP
WBC # BLD: 9.58 K/UL — SIGNIFICANT CHANGE UP (ref 4.8–10.8)
WBC # FLD AUTO: 9.58 K/UL — SIGNIFICANT CHANGE UP (ref 4.8–10.8)

## 2022-05-08 PROCEDURE — 99239 HOSP IP/OBS DSCHRG MGMT >30: CPT | Mod: 25

## 2022-05-08 PROCEDURE — 52332 CYSTOSCOPY AND TREATMENT: CPT | Mod: RT

## 2022-05-08 PROCEDURE — 99222 1ST HOSP IP/OBS MODERATE 55: CPT

## 2022-05-08 RX ORDER — HYDROMORPHONE HYDROCHLORIDE 2 MG/ML
1 INJECTION INTRAMUSCULAR; INTRAVENOUS; SUBCUTANEOUS
Refills: 0 | Status: DISCONTINUED | OUTPATIENT
Start: 2022-05-08 | End: 2022-05-08

## 2022-05-08 RX ORDER — ALBUTEROL 90 UG/1
2 AEROSOL, METERED ORAL EVERY 6 HOURS
Refills: 0 | Status: DISCONTINUED | OUTPATIENT
Start: 2022-05-08 | End: 2022-05-08

## 2022-05-08 RX ORDER — ATORVASTATIN CALCIUM 80 MG/1
80 TABLET, FILM COATED ORAL AT BEDTIME
Refills: 0 | Status: DISCONTINUED | OUTPATIENT
Start: 2022-05-08 | End: 2022-05-08

## 2022-05-08 RX ORDER — APIXABAN 2.5 MG/1
1 TABLET, FILM COATED ORAL
Qty: 0 | Refills: 0 | DISCHARGE
Start: 2022-05-08

## 2022-05-08 RX ORDER — HEPARIN SODIUM 5000 [USP'U]/ML
5000 INJECTION INTRAVENOUS; SUBCUTANEOUS EVERY 8 HOURS
Refills: 0 | Status: DISCONTINUED | OUTPATIENT
Start: 2022-05-08 | End: 2022-05-08

## 2022-05-08 RX ORDER — CHLORHEXIDINE GLUCONATE 213 G/1000ML
1 SOLUTION TOPICAL
Refills: 0 | Status: DISCONTINUED | OUTPATIENT
Start: 2022-05-08 | End: 2022-05-08

## 2022-05-08 RX ORDER — ONDANSETRON 8 MG/1
4 TABLET, FILM COATED ORAL ONCE
Refills: 0 | Status: DISCONTINUED | OUTPATIENT
Start: 2022-05-08 | End: 2022-05-08

## 2022-05-08 RX ORDER — KETOROLAC TROMETHAMINE 30 MG/ML
10 SYRINGE (ML) INJECTION ONCE
Refills: 0 | Status: DISCONTINUED | OUTPATIENT
Start: 2022-05-08 | End: 2022-05-08

## 2022-05-08 RX ORDER — APIXABAN 2.5 MG/1
1 TABLET, FILM COATED ORAL
Qty: 30 | Refills: 0
Start: 2022-05-08 | End: 2022-06-06

## 2022-05-08 RX ORDER — SODIUM CHLORIDE 9 MG/ML
1000 INJECTION, SOLUTION INTRAVENOUS
Refills: 0 | Status: DISCONTINUED | OUTPATIENT
Start: 2022-05-08 | End: 2022-05-08

## 2022-05-08 RX ORDER — KETOROLAC TROMETHAMINE 30 MG/ML
15 SYRINGE (ML) INJECTION ONCE
Refills: 0 | Status: DISCONTINUED | OUTPATIENT
Start: 2022-05-08 | End: 2022-05-08

## 2022-05-08 RX ORDER — FAMOTIDINE 10 MG/ML
20 INJECTION INTRAVENOUS DAILY
Refills: 0 | Status: DISCONTINUED | OUTPATIENT
Start: 2022-05-08 | End: 2022-05-08

## 2022-05-08 RX ORDER — OXYCODONE HYDROCHLORIDE 5 MG/1
5 TABLET ORAL ONCE
Refills: 0 | Status: DISCONTINUED | OUTPATIENT
Start: 2022-05-08 | End: 2022-05-08

## 2022-05-08 RX ORDER — BUDESONIDE AND FORMOTEROL FUMARATE DIHYDRATE 160; 4.5 UG/1; UG/1
2 AEROSOL RESPIRATORY (INHALATION)
Refills: 0 | Status: DISCONTINUED | OUTPATIENT
Start: 2022-05-08 | End: 2022-05-08

## 2022-05-08 RX ORDER — HYDROMORPHONE HYDROCHLORIDE 2 MG/ML
0.5 INJECTION INTRAMUSCULAR; INTRAVENOUS; SUBCUTANEOUS
Refills: 0 | Status: DISCONTINUED | OUTPATIENT
Start: 2022-05-08 | End: 2022-05-08

## 2022-05-08 RX ORDER — KETOROLAC TROMETHAMINE 30 MG/ML
1 SYRINGE (ML) INJECTION
Qty: 3 | Refills: 0
Start: 2022-05-08

## 2022-05-08 RX ORDER — ACETAMINOPHEN 500 MG
2 TABLET ORAL
Qty: 0 | Refills: 0 | DISCHARGE
Start: 2022-05-08

## 2022-05-08 RX ORDER — ONDANSETRON 8 MG/1
4 TABLET, FILM COATED ORAL EVERY 6 HOURS
Refills: 0 | Status: DISCONTINUED | OUTPATIENT
Start: 2022-05-08 | End: 2022-05-08

## 2022-05-08 RX ORDER — ACETAMINOPHEN 500 MG
650 TABLET ORAL EVERY 6 HOURS
Refills: 0 | Status: DISCONTINUED | OUTPATIENT
Start: 2022-05-08 | End: 2022-05-08

## 2022-05-08 RX ADMIN — MORPHINE SULFATE 2 MILLIGRAM(S): 50 CAPSULE, EXTENDED RELEASE ORAL at 03:47

## 2022-05-08 RX ADMIN — BUDESONIDE AND FORMOTEROL FUMARATE DIHYDRATE 2 PUFF(S): 160; 4.5 AEROSOL RESPIRATORY (INHALATION) at 09:11

## 2022-05-08 RX ADMIN — Medication 15 MILLIGRAM(S): at 17:52

## 2022-05-08 NOTE — PROGRESS NOTE ADULT - NS ATTEND AMEND GEN_ALL_CORE FT
Pt seen and examined. Has right flank pain and renal colic. Options discussed as high risk. All questions answered and CT reviewed. Will setent under sedation. Agree with note above.

## 2022-05-08 NOTE — DISCHARGE NOTE PROVIDER - NSDCMRMEDTOKEN_GEN_ALL_CORE_FT
acetaminophen 325 mg oral tablet: 2 tab(s) orally every 6 hours, As needed, Temp greater or equal to 38C (100.4F), Mild Pain (1 - 3)  atorvastatin 80 mg oral tablet: 1 tab(s) orally once a day (at bedtime)  budesonide-formoterol 160 mcg-4.5 mcg/inh inhalation aerosol: 2 puff(s) inhaled 2 times a day  Eliquis Starter Pack for Treatment of DVT and PE 5 mg oral tablet: 2 tab(s) orally every 12 hours  famotidine 20 mg oral tablet: 1 tab(s) orally once a day   acetaminophen 325 mg oral tablet: 2 tab(s) orally every 6 hours, As needed, Temp greater or equal to 38C (100.4F), Mild Pain (1 - 3)  atorvastatin 80 mg oral tablet: 1 tab(s) orally once a day (at bedtime)  budesonide-formoterol 160 mcg-4.5 mcg/inh inhalation aerosol: 2 puff(s) inhaled 2 times a day  Eliquis 5 mg oral tablet: 1 tab(s) orally 2 times a day  famotidine 20 mg oral tablet: 1 tab(s) orally once a day

## 2022-05-08 NOTE — DISCHARGE NOTE PROVIDER - NSDCCPCAREPLAN_GEN_ALL_CORE_FT
PRINCIPAL DISCHARGE DIAGNOSIS  Diagnosis: Renal colic on right side  Assessment and Plan of Treatment:       SECONDARY DISCHARGE DIAGNOSES  Diagnosis: Left renal mass  Assessment and Plan of Treatment:      PRINCIPAL DISCHARGE DIAGNOSIS  Diagnosis: Renal colic on right side  Assessment and Plan of Treatment: You had an obstructive renal stone in your rt ureter. Pt      SECONDARY DISCHARGE DIAGNOSES  Diagnosis: Left renal mass  Assessment and Plan of Treatment:      PRINCIPAL DISCHARGE DIAGNOSIS  Diagnosis: Renal colic on right side  Assessment and Plan of Treatment: You had an obstructive renal stone in your rt ureter. Urology placed a stent which should relieve the obstruction and your pain and urinary symptoms. You need to follow up with urology to get the stent removed within 4 weeks.      SECONDARY DISCHARGE DIAGNOSES  Diagnosis: Left renal mass  Assessment and Plan of Treatment: There is a concern for cancer given the renal mass as well as nodules in the lung. It is imperative to see oncology within a week to undergo testing and workup to rule out cancer

## 2022-05-08 NOTE — DISCHARGE NOTE PROVIDER - CARE PROVIDERS DIRECT ADDRESSES
,DirectAddress_Unknown,DirectAddress_Unknown,DirectAddress_Unknown ,DirectAddress_Unknown,[email or web address],DirectAddress_Unknown

## 2022-05-08 NOTE — DISCHARGE NOTE NURSING/CASE MANAGEMENT/SOCIAL WORK - NSDCPEFALRISK_GEN_ALL_CORE
For information on Fall & Injury Prevention, visit: [email or web address] OR  [email or web address] OR  https://www.cdc.gov/steadi/patient.html

## 2022-05-08 NOTE — PRE-ANESTHESIA EVALUATION ADULT - NSANTHADDINFOFT_GEN_ALL_CORE
Discussed risks and benefits of anesthesia including but not limited to the risk of sore throat, N/V, damage to mouth, teeth and lips, stroke, MI and even death.  Patient demonstrates understanding, all questions answered. The patient wishes to proceed with planned treatment.     Recent stroke - Urologist feels procedure is urgent and not elective based on sx and risk of sepsis. Plan is to attempt under sedation with general as back up plan.

## 2022-05-08 NOTE — DISCHARGE NOTE PROVIDER - PROVIDER TOKENS
FREE:[LAST:[primary care MD],PHONE:[(   )    -],FAX:[(   )    -],FOLLOWUP:[1 week]],FREE:[LAST:[hematology/oncology],PHONE:[(   )    -],FAX:[(   )    -],FOLLOWUP:[1 week]],FREE:[LAST:[pulmonary],PHONE:[(   )    -],FAX:[(   )    -]] PROVIDER:[TOKEN:[[id number]],FOLLOWUP:[1 week]],PROVIDER:[TOKEN:[[id number]],FOLLOWUP:[1 week]],PROVIDER:[TOKEN:[[id number]],FOLLOWUP:[1 week]]

## 2022-05-08 NOTE — DISCHARGE NOTE PROVIDER - HOSPITAL COURSE
70 y/o M with PMHx of COVID (+) for  past 10 days, COPD,  VICKI on CPAP, HCV+, with recent admission 4/14-4/18s s/p acute ischemic stroke of left temporal lobe, s/p T ICI 3 recanalization of the inferior division of M2 on the left side from thrombotic occlusion 4/14, cardioembolic 2/2 new onset Afib, and also with RLE DVT (currently on Eliquis 5 mg BID), now presents with right flank pain which began morning of admission. Patient reports he started having right flank pain while driving at approximately 8 AM. Patient describes pain as sharp, 8/10, initially constant, subsequently intermittent. Patient reports pain radiating to groin. Denies any prior episodes of flank pain. Patient on Eliquis, took 5mg morning of admission. Patient denies any recent fever, chills, chest pain, shortness of breath, nausea, vomiting, diarrhea, constipation, frequent urination, burning with urination, hematuria, or other acute symptoms.  CT AP showed 7 x 5 x 5 mm right proximal ureteral calculus, with associated mild right perinephric stranding and hydronephrosis; approximately 4.7 x 4.4 x 4.5 cm left upper pole mass likely renal cell   carcinoma; multiple new small right lower lobe nodules measuring less than 7 mm (may be infectious/inflammatory and less likely metastatic, however metastatic disease cannot be excluded; short-term follow-up in 3 months is   recommended). Patient underwent cystoscopy with R ureteral stent placement 5/8/22 with no immediate complications, cleared by urology for discharge. Patient with no further events of complaints, discharged to home on 5/8/22. Patient recommended to follow up with urology, PMD, hematology/oncology, and pulmonary on discharge. Also recommended to resume eliquis in evening of discharge.

## 2022-05-08 NOTE — DISCHARGE NOTE PROVIDER - NSFOLLOWUPCLINICS_GEN_ALL_ED_FT
[facility]  Medicine  [address], [place]   Phone: [phone]  Fax:   Follow Up Time: 1 week

## 2022-05-08 NOTE — DISCHARGE NOTE PROVIDER - CARE PROVIDER_API CALL
primary care MD,   Phone: (   )    -  Fax: (   )    -  Follow Up Time: 1 week    hematology/oncology,   Phone: (   )    -  Fax: (   )    -  Follow Up Time: 1 week    pulmonary,   Phone: (   )    -  Fax: (   )    -  Follow Up Time:    Clinton Pruitt)  Urology  [address] [facility], [address] [place] [id number]  Phone: [phone]  Fax: [phone]  Follow Up Time: 1 week    Enoc Ling)  Hematology; Internal Medicine; Medical Oncology  [address] [place] [address]  Phone: [phone]  Fax: [phone]  Follow Up Time: 1 week    Tonio Riddle)  Critical Care Medicine; Internal Medicine; Pulmonary Disease  [address] [place] [address], [place] [address]  Phone: [phone]  Fax: [phone]  Follow Up Time: 1 week

## 2022-05-08 NOTE — DISCHARGE NOTE NURSING/CASE MANAGEMENT/SOCIAL WORK - PATIENT PORTAL LINK FT
You can access the FollowMyHealth Patient Portal offered by [facility] by registering at the following website: [email or web address]. By joining [employer]’s FollowMyHealth portal, you will also be able to view your health information using other applications (apps) compatible with our system.

## 2022-05-08 NOTE — PROGRESS NOTE ADULT - SUBJECTIVE AND OBJECTIVE BOX
Patient is a 71y old  Male who presents with a chief complaint of renal stone, colic pain (08 May 2022 10:42)    Handoff: Pt admitted for a rt ureteral obstructive stone resulting in flank pain and hydronephrosis.       SUBJECTIVE / OVERNIGHT EVENTS: Pt seen early this am prior to OR. Pt can resume diet and eliquis postop. Spoke to urology postop and pt is cleared for discharge      ADDITIONAL REVIEW OF SYSTEMS:  flank pain resolved    MEDICATIONS  (STANDING):    MEDICATIONS  (PRN):      CAPILLARY BLOOD GLUCOSE        I&O's Summary      PHYSICAL EXAM:  Vital Signs Last 24 Hrs  T(C): 36.3 (08 May 2022 12:55), Max: 36.5 (08 May 2022 12:10)  T(F): 97.3 (08 May 2022 12:55), Max: 97.7 (08 May 2022 12:10)  HR: 67 (08 May 2022 12:55) (62 - 93)  BP: 128/82 (08 May 2022 12:55) (108/66 - 137/77)  BP(mean): --  RR: 17 (08 May 2022 12:55) (16 - 18)  SpO2: 96% (08 May 2022 12:55) (95% - 96%)  CONSTITUTIONAL: NAD, well-developed, well-groomed  EYES: PERRLA; conjunctiva and sclera clear  ENMT: Moist oral mucosa, no pharyngeal injection or exudates; normal dentition  NECK: Supple, no palpable masses; no thyromegaly  RESPIRATORY: Normal respiratory effort; lungs are clear to auscultation bilaterally  CARDIOVASCULAR: Regular rate and rhythm, normal S1 and S2, no murmur/rub/gallop; No lower extremity edema; Peripheral pulses are 2+ bilaterally  ABDOMEN: Nontender to palpation, normoactive bowel sounds, no rebound/guarding; No hepatosplenomegaly  MUSCULOSKELETAL:  Normal gait; no clubbing or cyanosis of digits; no joint swelling or tenderness to palpation  PSYCH: A+O to person, place, and time; affect appropriate  NEUROLOGY: CN 2-12 are intact and symmetric; no gross sensory deficits   SKIN: No rashes; no palpable lesions    LABS:                        14.7   9.58  )-----------( 217      ( 08 May 2022 08:56 )             44.1     05-08    139  |  104  |  11  ----------------------------<  110<H>  4.2   |  24  |  0.7    Ca    8.8      08 May 2022 08:56    TPro  6.4  /  Alb  3.8  /  TBili  0.7  /  DBili  x   /  AST  22  /  ALT  26  /  AlkPhos  91  05-08    PT/INR - ( 08 May 2022 08:56 )   PT: 14.10 sec;   INR: 1.23 ratio         PTT - ( 08 May 2022 08:56 )  PTT:35.8 sec  CARDIAC MARKERS ( 07 May 2022 09:57 )  x     / <0.01 ng/mL / x     / x     / x          Urinalysis Basic - ( 07 May 2022 12:00 )    Color: Yellow / Appearance: Clear / S.020 / pH: x  Gluc: x / Ketone: Trace  / Bili: Negative / Urobili: 0.2 mg/dL   Blood: x / Protein: 100 mg/dL / Nitrite: Negative   Leuk Esterase: Negative / RBC: 26-50 /HPF / WBC 3-5 /HPF   Sq Epi: x / Non Sq Epi: Few /HPF / Bacteria: Moderate          RADIOLOGY & ADDITIONAL TESTS:  Results Reviewed:   Imaging Personally Reviewed:  Electrocardiogram Personally Reviewed:    COORDINATION OF CARE:  Care Discussed with Consultants/Other Providers [Y/N]:  Prior or Outpatient Records Reviewed [Y/N]:  
pt seen today   feeling " ok " , needed IV pain meds at 345am for right flank pain   denies fever/ chill s/ dysuria       70 y/o M with PMHx of COVID (+) for  past 10 days, COPD,  VICKI on CPAP, HCV+, with recent admission 4/14 --4/18s s/p acute ischemic stroke of left temporal lobe, s/p T ICI 3 recanalization of the inferior division of M2 on the left-hand side from thrombotic occlusion 4/14, cardioembolic 2/2 new onset afib, and also with RLE DVT (currently on Eliquis 5 mg BID) -- now presents with right flank pain which began this AM. Pt reports started having right flank pain while driving at 8 am. PT reports pain sharp, 8/10 initially constant , now intermittent . Pt states pain radiating to groin. PT states first time having flank pain. PT on Eliquis took 5 mg this am. Pt denies nausea or vomiting. PT denies fever or chills. Pt denies frequent urination, hematuria, chest pain, shortness of breath, burning with urination, constipation or diarrhea.     Vital Signs Last 24 Hrs  T(C): 36.2 (08 May 2022 09:23), Max: 36.4 (07 May 2022 19:10)  T(F): 97.2 (08 May 2022 09:23), Max: 97.5 (07 May 2022 19:10)  HR: 79 (08 May 2022 09:23) (69 - 93)  BP: 130/94 (08 May 2022 09:23) (108/66 - 137/77)  BP(mean): --  RR: 16 (08 May 2022 09:23) (16 - 18)  SpO2: 95% (08 May 2022 09:23) (95% - 96%)    PE:  chest: cta b/l  cardio: s1s2 heard  abd: bs+ nt nd     05-08    139  |  104  |  11  ----------------------------<  110<H>  4.2   |  24  |  0.7    Ca    8.8      08 May 2022 08:56    TPro  6.4  /  Alb  3.8  /  TBili  0.7  /  DBili  x   /  AST  22  /  ALT  26  /  AlkPhos  91  05-08                            14.7   9.58  )-----------( 217      ( 08 May 2022 08:56 )             44.1     CAPILLARY BLOOD GLUCOSE        CARDIAC MARKERS ( 07 May 2022 09:57 )  x     / <0.01 ng/mL / x     / x     / x

## 2022-05-08 NOTE — PROGRESS NOTE ADULT - ASSESSMENT
72 yo M with PMHx of COPD,  VICKI on CPAP, HCV+, with recent admission 4/14 --4/18 (presented with aphasia) and is s/p acute ischemic stroke of left temporal lobe, s/p T ICI 3 recanalization of the inferior division of M2 on the left-hand side from thrombotic occlusion 4/14. cardioembolic 2/2 new onset afib, and also with RLE DVT (currently on Eliquis) -- now presents with right flank pain which began this AM. Denies N/V/hematuria/difficulty urinating. has never seen a urologist.      Case D/W Dr. Pruitt: FOR OR TODAY FOR STENT PLACEMENT     1. Obstructing right proximal ureteral stone with hydro   - NPO P MN, pain management, IV fluids

## 2022-05-08 NOTE — DISCHARGE NOTE PROVIDER - NSDCFUADDINST_GEN_ALL_CORE_FT
Make appointment with primary care MD, urology, pulmonary, and hematology/oncology within 1 week of discharge. Resume taking eliquis tonight.

## 2022-05-08 NOTE — DISCHARGE NOTE PROVIDER - NSDCFUSCHEDAPPT_GEN_ALL_CORE_FT
[facility]  Neuro [address]  Scheduled Appointment: 05/11/2022    Bj Mercado  [facility]  Cardio [address]  Scheduled Appointment: 05/27/2022

## 2022-05-10 PROBLEM — I63.9 CEREBRAL INFARCTION, UNSPECIFIED: Chronic | Status: ACTIVE | Noted: 2022-05-07

## 2022-05-11 ENCOUNTER — APPOINTMENT (OUTPATIENT)
Dept: NEUROLOGY | Facility: CLINIC | Age: 72
End: 2022-05-11
Payer: MEDICARE

## 2022-05-11 ENCOUNTER — NON-APPOINTMENT (OUTPATIENT)
Age: 72
End: 2022-05-11

## 2022-05-11 VITALS
BODY MASS INDEX: 36.4 KG/M2 | DIASTOLIC BLOOD PRESSURE: 90 MMHG | OXYGEN SATURATION: 97 % | WEIGHT: 260 LBS | HEART RATE: 58 BPM | HEIGHT: 71 IN | SYSTOLIC BLOOD PRESSURE: 136 MMHG | TEMPERATURE: 98.1 F

## 2022-05-11 DIAGNOSIS — Z87.09 PERSONAL HISTORY OF OTHER DISEASES OF THE RESPIRATORY SYSTEM: ICD-10-CM

## 2022-05-11 DIAGNOSIS — Z87.442 PERSONAL HISTORY OF URINARY CALCULI: ICD-10-CM

## 2022-05-11 DIAGNOSIS — Z86.19 PERSONAL HISTORY OF OTHER INFECTIOUS AND PARASITIC DISEASES: ICD-10-CM

## 2022-05-11 PROCEDURE — 99215 OFFICE O/P EST HI 40 MIN: CPT

## 2022-05-11 NOTE — PHYSICAL EXAM
[FreeTextEntry1] : Focal neurological exam:\par \par MS: Awake, alert, oriented to person, place, situation and time. Normal affect. Follows commands. \par \par Language: Mild dysarthria and speech delay. \par \par CNs 2 - 12 intact. EOMI no nystagmus, no diplopia. VFF. No facial asymmetry b/l, full eye closure strength b/l. Hearing grossly normal. Head turning & shoulder shrug intact b/l. Tongue midline, normal movements, no atrophy.\par \par Motor: Normal muscle bulk & tone. No noticeable tremor or seizure. No pronator drift. Muscle strength of b/l UE and b/l LE 5/5. \par \par Reflexes: DTR of biceps 2+, knees 1+ \par \par Sensation: Intact to LT, temperature and vibration b/l throughout\par \par Cortical: No extinction\par \par Coordination: No dysmetria to FTN.\par \par Gait: No postural instability. Normal stance and tandem gait.\par \par NIHSS 1\par \par \par

## 2022-05-11 NOTE — HISTORY OF PRESENT ILLNESS
[FreeTextEntry1] : Patient is 70 yo RH man, former , quit smoking 17 years ago with PMHx of COPD, recent COVID infection, VICKI on CPAP, HCV+, recent nephrolithiasis with renal stent, left renal mass who presents as HFU from 4/14/22 for ischemic stroke in L MCA territory associated with thrombotic occlusions/p TICI 3 recanalization of inferior division of Left M2 by Dr. Castellon. Echo without bubble showed showed EF 64%, normal LA size. He was found to have episode of Afib and started on Eliquis 5 BID. DVT sonogram was also positive for R posterior tibial vein thrombosis. No MRI was done. Residual deficits include mild dysarthria. \par \par CTH 4/15/22: 2x2cm hypodensity of L posterior temporal region\par  \par CTP/CTA H/N: \par -Perfusion imaging predicts a core infarct of 7 mL in the left temporal lobe within the left MCA territory. Based on the perfusion mismatch, there is a predicted volume of 30 mL penumbra of tissue at risk. Mismatch ratio of 5.3.\par -Abrupt occlusion in the inferior terminal branch of the left M2 MCA. The remaining MCA branches are patent.\par \par LDL 94\par A1c 6.3 \par \par Today, he presents with wife and states that he feels at baseline. Says COPD is preventing him from exercising, but does not want to be referred to Rehab Medicine. He has speech therapy. \par \par **MCOT showed Afib \par

## 2022-05-12 ENCOUNTER — NON-APPOINTMENT (OUTPATIENT)
Age: 72
End: 2022-05-12

## 2022-05-13 DIAGNOSIS — Z79.01 LONG TERM (CURRENT) USE OF ANTICOAGULANTS: ICD-10-CM

## 2022-05-13 DIAGNOSIS — E78.5 HYPERLIPIDEMIA, UNSPECIFIED: ICD-10-CM

## 2022-05-13 DIAGNOSIS — G47.33 OBSTRUCTIVE SLEEP APNEA (ADULT) (PEDIATRIC): ICD-10-CM

## 2022-05-13 DIAGNOSIS — I48.20 CHRONIC ATRIAL FIBRILLATION, UNSPECIFIED: ICD-10-CM

## 2022-05-13 DIAGNOSIS — J44.9 CHRONIC OBSTRUCTIVE PULMONARY DISEASE, UNSPECIFIED: ICD-10-CM

## 2022-05-13 DIAGNOSIS — I82.401 ACUTE EMBOLISM AND THROMBOSIS OF UNSPECIFIED DEEP VEINS OF RIGHT LOWER EXTREMITY: ICD-10-CM

## 2022-05-13 DIAGNOSIS — N20.0 CALCULUS OF KIDNEY: ICD-10-CM

## 2022-05-13 DIAGNOSIS — Z20.822 CONTACT WITH AND (SUSPECTED) EXPOSURE TO COVID-19: ICD-10-CM

## 2022-05-13 DIAGNOSIS — K21.9 GASTRO-ESOPHAGEAL REFLUX DISEASE WITHOUT ESOPHAGITIS: ICD-10-CM

## 2022-05-13 DIAGNOSIS — N28.89 OTHER SPECIFIED DISORDERS OF KIDNEY AND URETER: ICD-10-CM

## 2022-05-13 DIAGNOSIS — N13.2 HYDRONEPHROSIS WITH RENAL AND URETERAL CALCULOUS OBSTRUCTION: ICD-10-CM

## 2022-05-16 ENCOUNTER — APPOINTMENT (OUTPATIENT)
Dept: UROLOGY | Facility: CLINIC | Age: 72
End: 2022-05-16
Payer: MEDICARE

## 2022-05-16 ENCOUNTER — RESULT REVIEW (OUTPATIENT)
Age: 72
End: 2022-05-16

## 2022-05-16 PROCEDURE — 99214 OFFICE O/P EST MOD 30 MIN: CPT

## 2022-05-16 NOTE — ASSESSMENT
[FreeTextEntry1] : 71-year-old male with right-sided stone history of renal colic who is currently stented and also a left renal mass which will need to be addressed at a future point.  We will obtain cardiology and neurology clearance and try to figure out whether he would be best served with a shockwave lithotripsy under sedation or a ureteroscopy and laser lithotripsy under general anesthesia.  This was discussed with the patient and his wife in detail and all their questions were otherwise answered.  They will discuss this further with cardiology and try to call neurology and I have sent email to neurology and cardiology to try to get these questions answered.  Total of 35 minutes were spent with the patient and his wife reviewing these issues answering their questions and coordinating care.

## 2022-05-16 NOTE — HISTORY OF PRESENT ILLNESS
[FreeTextEntry1] : 71-year-old male who was emergently stented due to right renal colic secondary to a 7 mm right upper ureteral calculus.  He was also incidentally noted to have a 5 cm left renal mass.  He has a complicated history with recent stroke secondary to a thrombotic phenomenon and also atrial fibrillation and was anticoagulated with Eliquis.  Because of his risks involved he was stented with a 4.8 Spanish stent under IV sedation only and did well.  He has had some hematuria and occasional discomfort but is otherwise doing well.  He would like to proceed with a definitive stone procedure as soon as possible and we had a long discussion today with him and his wife about the possibilities.  We will need medical clearance from both cardiology and neurology about his ability to have general anesthesia and if he is not at very high risk for general anesthesia whether he can come off his anticoagulation in which case he could conceivably undergo shockwave lithotripsy to try to break up the stone.  Risks and benefits were discussed and all their questions were otherwise answered.  We will address his renal mass at a bladder time.

## 2022-05-16 NOTE — PHYSICAL EXAM
[General Appearance - Well Nourished] : well nourished [Normal Appearance] : normal appearance [Well Groomed] : well groomed [General Appearance - In No Acute Distress] : no acute distress [FreeTextEntry1] : Slightly dysarthric. [Oriented To Time, Place, And Person] : oriented to person, place, and time

## 2022-05-24 ENCOUNTER — RESULT REVIEW (OUTPATIENT)
Age: 72
End: 2022-05-24

## 2022-05-24 ENCOUNTER — OUTPATIENT (OUTPATIENT)
Dept: OUTPATIENT SERVICES | Facility: HOSPITAL | Age: 72
LOS: 1 days | Discharge: HOME | End: 2022-05-24
Payer: MEDICARE

## 2022-05-24 DIAGNOSIS — N20.0 CALCULUS OF KIDNEY: ICD-10-CM

## 2022-05-24 PROCEDURE — 76775 US EXAM ABDO BACK WALL LIM: CPT | Mod: 26

## 2022-05-24 PROCEDURE — 74018 RADEX ABDOMEN 1 VIEW: CPT | Mod: 26

## 2022-05-27 ENCOUNTER — APPOINTMENT (OUTPATIENT)
Dept: CARDIOLOGY | Facility: CLINIC | Age: 72
End: 2022-05-27
Payer: MEDICARE

## 2022-05-27 VITALS
HEART RATE: 96 BPM | WEIGHT: 260 LBS | TEMPERATURE: 98 F | HEIGHT: 71 IN | BODY MASS INDEX: 36.4 KG/M2 | DIASTOLIC BLOOD PRESSURE: 84 MMHG | SYSTOLIC BLOOD PRESSURE: 126 MMHG | RESPIRATION RATE: 16 BRPM

## 2022-05-27 DIAGNOSIS — Z82.5 FAMILY HISTORY OF ASTHMA AND OTHER CHRONIC LOWER RESPIRATORY DISEASES: ICD-10-CM

## 2022-05-27 DIAGNOSIS — Z78.9 OTHER SPECIFIED HEALTH STATUS: ICD-10-CM

## 2022-05-27 DIAGNOSIS — Z87.891 PERSONAL HISTORY OF NICOTINE DEPENDENCE: ICD-10-CM

## 2022-05-27 PROCEDURE — 99214 OFFICE O/P EST MOD 30 MIN: CPT

## 2022-05-27 PROCEDURE — 93000 ELECTROCARDIOGRAM COMPLETE: CPT

## 2022-05-27 NOTE — ASSESSMENT
[FreeTextEntry1] : bradycardia\par - likley related related to VICKI and SSS\par - no indication for PPM at this time\par \par \par AF\par - cont eliquis 5 mg q12; no bleeding\par - Will discuss different options for treatmetn of AF next visit\par \par _ FU with cardiology

## 2022-05-27 NOTE — HISTORY OF PRESENT ILLNESS
[FreeTextEntry1] : 72 y/o M with PMHx of COVID (+) for  past 10 days, COPD,  VICKI on CPAP, HCV+, with recent admission 4/14-4/18s s/p acute ischemic stroke of left temporal lobe, s/p T ICI 3 recanalization of the inferior division of M2 on the left  side from thrombotic occlusion 4/14, cardioembolic 2/2 new onset Afib, and also  with RLE DVT (currently on Eliquis 5 mg BID)\par \par Patient found to have nocturnal bradycardia and is here to establish care for atrial fibrillation.\par \par EKG AF 82 bpm\par Echo - EF 64%; no valvular abnormalities \par EM - AF 84% burden, HR ; Night pauses

## 2022-05-31 ENCOUNTER — APPOINTMENT (OUTPATIENT)
Dept: CARDIOLOGY | Facility: CLINIC | Age: 72
End: 2022-05-31
Payer: MEDICARE

## 2022-05-31 ENCOUNTER — NON-APPOINTMENT (OUTPATIENT)
Age: 72
End: 2022-05-31

## 2022-05-31 VITALS
TEMPERATURE: 97.8 F | WEIGHT: 250 LBS | SYSTOLIC BLOOD PRESSURE: 111 MMHG | HEIGHT: 71 IN | HEART RATE: 77 BPM | BODY MASS INDEX: 35 KG/M2 | DIASTOLIC BLOOD PRESSURE: 75 MMHG

## 2022-05-31 PROCEDURE — 93228 REMOTE 30 DAY ECG REV/REPORT: CPT

## 2022-05-31 PROCEDURE — 99214 OFFICE O/P EST MOD 30 MIN: CPT

## 2022-05-31 PROCEDURE — 93000 ELECTROCARDIOGRAM COMPLETE: CPT

## 2022-05-31 NOTE — PHYSICAL EXAM

## 2022-05-31 NOTE — REVIEW OF SYSTEMS
[Feeling Fatigued] : feeling fatigued [Urinary Frequency] : urinary frequency [Joint Pain] : joint pain [Negative] : Heme/Lymph [FreeTextEntry5] : see HPI [FreeTextEntry6] : see HPI [FreeTextEntry8] : see HPI [de-identified] : see HPI

## 2022-05-31 NOTE — HISTORY OF PRESENT ILLNESS
[FreeTextEntry1] : 70 y/o M with PMHx of COVID infection, COPD,  VICKI on CPAP, HCV+, with recent admission 4/14-4/18s for an acute ischemic stroke of left temporal lobe, s/p T ICI 3 recanalization of the inferior division of M2 on the left  side from thrombotic occlusion 4/14, cardioembolic 2/2 new onset Afib, and also  with RLE DVT (currently on Eliquis 5 mg BID)\par \par Patient found to have nocturnal bradycardia, which was felt to be related to VICKI, and no PPM was required. He is rate controlled. Needs clearance for Renal stone lithotripsy.\par \par EKG AF 82 bpm\par Echo - EF 64%; no valvular abnormalities \par EM - AF 84% burden, HR ; Night pauses

## 2022-06-03 ENCOUNTER — NON-APPOINTMENT (OUTPATIENT)
Age: 72
End: 2022-06-03

## 2022-06-07 ENCOUNTER — NON-APPOINTMENT (OUTPATIENT)
Age: 72
End: 2022-06-07

## 2022-06-23 ENCOUNTER — NON-APPOINTMENT (OUTPATIENT)
Age: 72
End: 2022-06-23

## 2022-06-27 ENCOUNTER — APPOINTMENT (OUTPATIENT)
Dept: UROLOGY | Facility: CLINIC | Age: 72
End: 2022-06-27

## 2022-07-07 ENCOUNTER — RESULT REVIEW (OUTPATIENT)
Age: 72
End: 2022-07-07

## 2022-07-07 ENCOUNTER — OUTPATIENT (OUTPATIENT)
Dept: OUTPATIENT SERVICES | Facility: HOSPITAL | Age: 72
LOS: 1 days | Discharge: HOME | End: 2022-07-07

## 2022-07-07 VITALS
HEART RATE: 57 BPM | HEIGHT: 71 IN | OXYGEN SATURATION: 96 % | RESPIRATION RATE: 20 BRPM | DIASTOLIC BLOOD PRESSURE: 73 MMHG | WEIGHT: 250 LBS | TEMPERATURE: 97 F | SYSTOLIC BLOOD PRESSURE: 143 MMHG

## 2022-07-07 DIAGNOSIS — N20.2 CALCULUS OF KIDNEY WITH CALCULUS OF URETER: ICD-10-CM

## 2022-07-07 DIAGNOSIS — Z01.818 ENCOUNTER FOR OTHER PREPROCEDURAL EXAMINATION: ICD-10-CM

## 2022-07-07 DIAGNOSIS — Z98.890 OTHER SPECIFIED POSTPROCEDURAL STATES: Chronic | ICD-10-CM

## 2022-07-07 LAB
A1C WITH ESTIMATED AVERAGE GLUCOSE RESULT: 5.3 % — SIGNIFICANT CHANGE UP (ref 4–5.6)
ALBUMIN SERPL ELPH-MCNC: 4.2 G/DL — SIGNIFICANT CHANGE UP (ref 3.5–5.2)
ALP SERPL-CCNC: 105 U/L — SIGNIFICANT CHANGE UP (ref 30–115)
ALT FLD-CCNC: 26 U/L — SIGNIFICANT CHANGE UP (ref 0–41)
ANION GAP SERPL CALC-SCNC: 13 MMOL/L — SIGNIFICANT CHANGE UP (ref 7–14)
APPEARANCE UR: ABNORMAL
APTT BLD: 32.5 SEC — SIGNIFICANT CHANGE UP (ref 27–39.2)
AST SERPL-CCNC: 25 U/L — SIGNIFICANT CHANGE UP (ref 0–41)
BACTERIA # UR AUTO: NEGATIVE — SIGNIFICANT CHANGE UP
BASOPHILS # BLD AUTO: 0.03 K/UL — SIGNIFICANT CHANGE UP (ref 0–0.2)
BASOPHILS NFR BLD AUTO: 0.3 % — SIGNIFICANT CHANGE UP (ref 0–1)
BILIRUB SERPL-MCNC: 0.4 MG/DL — SIGNIFICANT CHANGE UP (ref 0.2–1.2)
BILIRUB UR-MCNC: NEGATIVE — SIGNIFICANT CHANGE UP
BUN SERPL-MCNC: 14 MG/DL — SIGNIFICANT CHANGE UP (ref 10–20)
CALCIUM SERPL-MCNC: 9.1 MG/DL — SIGNIFICANT CHANGE UP (ref 8.5–10.1)
CHLORIDE SERPL-SCNC: 100 MMOL/L — SIGNIFICANT CHANGE UP (ref 98–110)
CO2 SERPL-SCNC: 26 MMOL/L — SIGNIFICANT CHANGE UP (ref 17–32)
COLOR SPEC: COLORLESS — SIGNIFICANT CHANGE UP
CREAT SERPL-MCNC: 0.6 MG/DL — LOW (ref 0.7–1.5)
DIFF PNL FLD: ABNORMAL
EGFR: 104 ML/MIN/1.73M2 — SIGNIFICANT CHANGE UP
EOSINOPHIL # BLD AUTO: 0.23 K/UL — SIGNIFICANT CHANGE UP (ref 0–0.7)
EOSINOPHIL NFR BLD AUTO: 2.6 % — SIGNIFICANT CHANGE UP (ref 0–8)
EPI CELLS # UR: 1 /HPF — SIGNIFICANT CHANGE UP (ref 0–5)
ESTIMATED AVERAGE GLUCOSE: 105 MG/DL — SIGNIFICANT CHANGE UP (ref 68–114)
GLUCOSE SERPL-MCNC: 92 MG/DL — SIGNIFICANT CHANGE UP (ref 70–99)
GLUCOSE UR QL: NEGATIVE — SIGNIFICANT CHANGE UP
HCT VFR BLD CALC: 38.5 % — LOW (ref 42–52)
HGB BLD-MCNC: 12.3 G/DL — LOW (ref 14–18)
HYALINE CASTS # UR AUTO: 4 /LPF — SIGNIFICANT CHANGE UP (ref 0–7)
IMM GRANULOCYTES NFR BLD AUTO: 0.4 % — HIGH (ref 0.1–0.3)
INR BLD: 1.14 RATIO — SIGNIFICANT CHANGE UP (ref 0.65–1.3)
KETONES UR-MCNC: NEGATIVE — SIGNIFICANT CHANGE UP
LEUKOCYTE ESTERASE UR-ACNC: ABNORMAL
LYMPHOCYTES # BLD AUTO: 1.46 K/UL — SIGNIFICANT CHANGE UP (ref 1.2–3.4)
LYMPHOCYTES # BLD AUTO: 16.3 % — LOW (ref 20.5–51.1)
MCHC RBC-ENTMCNC: 29.4 PG — SIGNIFICANT CHANGE UP (ref 27–31)
MCHC RBC-ENTMCNC: 31.9 G/DL — LOW (ref 32–37)
MCV RBC AUTO: 91.9 FL — SIGNIFICANT CHANGE UP (ref 80–94)
MONOCYTES # BLD AUTO: 0.75 K/UL — HIGH (ref 0.1–0.6)
MONOCYTES NFR BLD AUTO: 8.4 % — SIGNIFICANT CHANGE UP (ref 1.7–9.3)
NEUTROPHILS # BLD AUTO: 6.45 K/UL — SIGNIFICANT CHANGE UP (ref 1.4–6.5)
NEUTROPHILS NFR BLD AUTO: 72 % — SIGNIFICANT CHANGE UP (ref 42.2–75.2)
NITRITE UR-MCNC: NEGATIVE — SIGNIFICANT CHANGE UP
NRBC # BLD: 0 /100 WBCS — SIGNIFICANT CHANGE UP (ref 0–0)
PH UR: 6 — SIGNIFICANT CHANGE UP (ref 5–8)
PLATELET # BLD AUTO: 239 K/UL — SIGNIFICANT CHANGE UP (ref 130–400)
POTASSIUM SERPL-MCNC: 4.1 MMOL/L — SIGNIFICANT CHANGE UP (ref 3.5–5)
POTASSIUM SERPL-SCNC: 4.1 MMOL/L — SIGNIFICANT CHANGE UP (ref 3.5–5)
PROT SERPL-MCNC: 6.8 G/DL — SIGNIFICANT CHANGE UP (ref 6–8)
PROT UR-MCNC: ABNORMAL
PROTHROM AB SERPL-ACNC: 13.1 SEC — HIGH (ref 9.95–12.87)
RBC # BLD: 4.19 M/UL — LOW (ref 4.7–6.1)
RBC # FLD: 13 % — SIGNIFICANT CHANGE UP (ref 11.5–14.5)
RBC CASTS # UR COMP ASSIST: 35 /HPF — HIGH (ref 0–4)
SODIUM SERPL-SCNC: 139 MMOL/L — SIGNIFICANT CHANGE UP (ref 135–146)
SP GR SPEC: 1.01 — SIGNIFICANT CHANGE UP (ref 1.01–1.03)
UROBILINOGEN FLD QL: SIGNIFICANT CHANGE UP
WBC # BLD: 8.96 K/UL — SIGNIFICANT CHANGE UP (ref 4.8–10.8)
WBC # FLD AUTO: 8.96 K/UL — SIGNIFICANT CHANGE UP (ref 4.8–10.8)
WBC UR QL: 11 /HPF — HIGH (ref 0–5)

## 2022-07-07 PROCEDURE — 93010 ELECTROCARDIOGRAM REPORT: CPT

## 2022-07-07 PROCEDURE — 71046 X-RAY EXAM CHEST 2 VIEWS: CPT | Mod: 26

## 2022-07-07 NOTE — H&P PST ADULT - REASON FOR ADMISSION
Patient is a  71 year old  male presenting to PAST in preparation for CYSTOSCOPY RIGHT URETEROSCOPY LASER LITHOTRIPSY URETERAL STENT PLACEMENT  on  7/27/22 under general anesthesia by Dr. boone

## 2022-07-07 NOTE — H&P PST ADULT - HISTORY OF PRESENT ILLNESS
pt with right side stone dx 4/22  stroke 4/22 admitted    PATIENT CURRENTLY DENIES CHEST PAIN  SHORTNESS OF BREATH  PALPITATIONS,  DYSURIA, OR UPPER RESPIRATORY INFECTION IN PAST 2 WEEKS  EXERCISE  TOLERANCE  1-2 FLIGHT OF STAIRS  WITHOUT SHORTNESS OF BREATH  denies travel outside the [place] in the past 30 days  + covid 4/22  Patient denies any signs or symptoms of COVID 19 and denies contact with known positive individuals.  They have an appointment for repeat COVID testing pre-procedure and acknowledge its time and place.  They were instructed to quarantine pre-procedure, practice exposure control measures, continue to self-monitor and report any concerns to their proceduralist.  pt advised self quarantine till day of procedure  Anesthesia Alert  Difficult Airway class IV  NO--History of neck surgery or radiation  NO--Limited ROM of neck  NO--History of Malignant hyperthermia  NO--No personal or family history of Pseudocholinesterase deficiency.  NO--Prior Anesthesia Complication  NO--Latex Allergy  NO--Loose teeth  NO--History of Rheumatoid Arthritis  NO--Bleeding risk   + VICKI cpap  NO--Other_____    PT DENIES ANY RASHES, ABRASION, OR OPEN WOUNDS OR CUTS    AS PER THE PT, THIS IS HIS/HER COMPLETE MEDICAL AND SURGICAL HX, INCLUDING MEDICATIONS PRESCRIBED AND OVER THE COUNTER    Patient verbalized understanding of instructions and was given the opportunity to ask questions and have them answered.    pt denies any suicidal ideation or thoughts, pt states not a threat to self or others    N20.2/72079,32702    Calculus of kidney with calculus of ureter    Encounter for other preprocedural examination    ^N20.2/83473,67498    Calculus of kidney with calculus of ureter    Encounter for other preprocedural examination    VICKI on CPAP    Cerebrovascular accident (CVA)

## 2022-07-07 NOTE — H&P PST ADULT - NSICDXPASTMEDICALHX_GEN_ALL_CORE_FT
PAST MEDICAL HISTORY:  Afib     Calculus, renal     Cerebrovascular accident (CVA)     Ex-smoker for more than 1 year     VICKI on CPAP

## 2022-07-08 PROBLEM — I48.91 UNSPECIFIED ATRIAL FIBRILLATION: Chronic | Status: ACTIVE | Noted: 2022-07-07

## 2022-07-08 PROBLEM — N20.0 CALCULUS OF KIDNEY: Chronic | Status: ACTIVE | Noted: 2022-07-07

## 2022-07-09 LAB
CULTURE RESULTS: SIGNIFICANT CHANGE UP
SPECIMEN SOURCE: SIGNIFICANT CHANGE UP

## 2022-07-18 ENCOUNTER — APPOINTMENT (OUTPATIENT)
Dept: UROLOGY | Facility: CLINIC | Age: 72
End: 2022-07-18

## 2022-07-24 ENCOUNTER — LABORATORY RESULT (OUTPATIENT)
Age: 72
End: 2022-07-24

## 2022-07-27 ENCOUNTER — APPOINTMENT (OUTPATIENT)
Dept: UROLOGY | Facility: HOSPITAL | Age: 72
End: 2022-07-27

## 2022-07-27 ENCOUNTER — OUTPATIENT (OUTPATIENT)
Dept: OUTPATIENT SERVICES | Facility: HOSPITAL | Age: 72
LOS: 1 days | Discharge: HOME | End: 2022-07-27

## 2022-07-27 ENCOUNTER — TRANSCRIPTION ENCOUNTER (OUTPATIENT)
Age: 72
End: 2022-07-27

## 2022-07-27 VITALS — RESPIRATION RATE: 15 BRPM | SYSTOLIC BLOOD PRESSURE: 137 MMHG | HEART RATE: 58 BPM | DIASTOLIC BLOOD PRESSURE: 83 MMHG

## 2022-07-27 VITALS
WEIGHT: 250 LBS | OXYGEN SATURATION: 96 % | DIASTOLIC BLOOD PRESSURE: 84 MMHG | TEMPERATURE: 96 F | SYSTOLIC BLOOD PRESSURE: 127 MMHG | RESPIRATION RATE: 17 BRPM | HEIGHT: 71 IN | HEART RATE: 72 BPM

## 2022-07-27 DIAGNOSIS — Z98.890 OTHER SPECIFIED POSTPROCEDURAL STATES: Chronic | ICD-10-CM

## 2022-07-27 PROCEDURE — 52356 CYSTO/URETERO W/LITHOTRIPSY: CPT | Mod: RT

## 2022-07-27 RX ORDER — ACETAMINOPHEN 500 MG
1000 TABLET ORAL ONCE
Refills: 0 | Status: DISCONTINUED | OUTPATIENT
Start: 2022-07-27 | End: 2022-07-27

## 2022-07-27 RX ORDER — HYDROMORPHONE HYDROCHLORIDE 2 MG/ML
0.5 INJECTION INTRAMUSCULAR; INTRAVENOUS; SUBCUTANEOUS
Refills: 0 | Status: DISCONTINUED | OUTPATIENT
Start: 2022-07-27 | End: 2022-07-27

## 2022-07-27 RX ORDER — MEPERIDINE HYDROCHLORIDE 50 MG/ML
12.5 INJECTION INTRAMUSCULAR; INTRAVENOUS; SUBCUTANEOUS
Refills: 0 | Status: DISCONTINUED | OUTPATIENT
Start: 2022-07-27 | End: 2022-07-27

## 2022-07-27 RX ORDER — METOCLOPRAMIDE HCL 10 MG
10 TABLET ORAL ONCE
Refills: 0 | Status: DISCONTINUED | OUTPATIENT
Start: 2022-07-27 | End: 2022-07-27

## 2022-07-27 RX ORDER — HYDROMORPHONE HYDROCHLORIDE 2 MG/ML
1 INJECTION INTRAMUSCULAR; INTRAVENOUS; SUBCUTANEOUS
Refills: 0 | Status: DISCONTINUED | OUTPATIENT
Start: 2022-07-27 | End: 2022-07-27

## 2022-07-27 RX ORDER — ONDANSETRON 8 MG/1
4 TABLET, FILM COATED ORAL ONCE
Refills: 0 | Status: DISCONTINUED | OUTPATIENT
Start: 2022-07-27 | End: 2022-07-27

## 2022-07-27 RX ORDER — PHENAZOPYRIDINE HCL 100 MG
200 TABLET ORAL ONCE
Refills: 0 | Status: COMPLETED | OUTPATIENT
Start: 2022-07-27 | End: 2022-07-27

## 2022-07-27 RX ORDER — SODIUM CHLORIDE 9 MG/ML
1000 INJECTION, SOLUTION INTRAVENOUS
Refills: 0 | Status: DISCONTINUED | OUTPATIENT
Start: 2022-07-27 | End: 2022-07-27

## 2022-07-27 RX ADMIN — Medication 200 MILLIGRAM(S): at 09:01

## 2022-07-27 NOTE — ASU DISCHARGE PLAN (ADULT/PEDIATRIC) - NS MD DC FALL RISK RISK
For information on Fall & Injury Prevention, visit: [email or web address] OR  [email or web address] OR  https://www.cdc.gov/steadi/patient.html

## 2022-07-27 NOTE — ASU PATIENT PROFILE, ADULT - FALL HARM RISK - UNIVERSAL INTERVENTIONS
Bed in lowest position, wheels locked, appropriate side rails in place/Call bell, personal items and telephone in reach/Instruct patient to call for assistance before getting out of bed or chair/Non-slip footwear when patient is out of bed/[place] to call system/Physically safe environment - no spills, clutter or unnecessary equipment/Purposeful Proactive Rounding/Room/bathroom lighting operational, light cord in reach

## 2022-07-27 NOTE — ASU PATIENT PROFILE, ADULT - NSICDXPASTMEDICALHX_GEN_ALL_CORE_FT
PAST MEDICAL HISTORY:  Afib     Calculus, renal     Cerebrovascular accident (CVA)     Ex-smoker for more than 1 year 15 yrs ago    VICKI on CPAP

## 2022-07-27 NOTE — BRIEF OPERATIVE NOTE - CONDITION POST OP
[Patient reported PAP Smear was normal] : Patient reported PAP Smear was normal [Gonorrhea test offered] : Gonorrhea test offered [Chlamydia test offered] : Chlamydia test offered [PapSmeardate] : 2020 [GonorrheaDate] : 2018 [ChlamydiaDate] : 2018 [FreeTextEntry1] : 11/2021 stable

## 2022-07-27 NOTE — CHART NOTE - NSCHARTNOTEFT_GEN_A_CORE
PACU ANESTHESIA ADMISSION NOTE      Procedure:   Post op diagnosis:      ____  Intubated  TV:______       Rate: ______      FiO2: ______    __x__  Patent Airway    __x__  Full return of protective reflexes    __x__  Full recovery from anesthesia / back to baseline status    Vitals:  T(C): 35.8 (07-27-22 @ 07:31), Max: 35.8 (07-27-22 @ 06:28)  HR: 72 (07-27-22 @ 07:31) (72 - 72)  BP: 127/84 (07-27-22 @ 07:31) (127/84 - 127/84)  RR: 17 (07-27-22 @ 07:31) (17 - 17)  SpO2: 96% (07-27-22 @ 07:31) (96% - 96%)    Mental Status:  __x__ Awake   ___x__ Alert   _____ Drowsy   _____ Sedated    Nausea/Vomiting:  __x__ NO  ______Yes,   See Post - Op Orders          Pain Scale (0-10):  __1___    Treatment: ____ None    __x__ See Post - Op/PCA Orders    Post - Operative Fluids:   ____ Oral   __x__ See Post - Op Orders    Plan: Discharge:   __x__Home       _____Floor     _____Critical Care    _____  Other:_________________    Comments: Patient had smooth intraoperative event, no anesthesia complication.  PACU Vital signs: HR:    70        BP:  118/75      RR:    22         O2 Sat:       97%     Temp 98

## 2022-07-28 PROBLEM — Z87.891 PERSONAL HISTORY OF NICOTINE DEPENDENCE: Chronic | Status: ACTIVE | Noted: 2022-07-07

## 2022-07-29 DIAGNOSIS — Z87.891 PERSONAL HISTORY OF NICOTINE DEPENDENCE: ICD-10-CM

## 2022-07-29 DIAGNOSIS — Z99.89 DEPENDENCE ON OTHER ENABLING MACHINES AND DEVICES: ICD-10-CM

## 2022-07-29 DIAGNOSIS — G47.33 OBSTRUCTIVE SLEEP APNEA (ADULT) (PEDIATRIC): ICD-10-CM

## 2022-07-29 DIAGNOSIS — N20.1 CALCULUS OF URETER: ICD-10-CM

## 2022-07-29 DIAGNOSIS — Z86.73 PERSONAL HISTORY OF TRANSIENT ISCHEMIC ATTACK (TIA), AND CEREBRAL INFARCTION WITHOUT RESIDUAL DEFICITS: ICD-10-CM

## 2022-07-29 DIAGNOSIS — I48.91 UNSPECIFIED ATRIAL FIBRILLATION: ICD-10-CM

## 2022-07-29 DIAGNOSIS — Z79.01 LONG TERM (CURRENT) USE OF ANTICOAGULANTS: ICD-10-CM

## 2022-08-01 ENCOUNTER — APPOINTMENT (OUTPATIENT)
Dept: UROLOGY | Facility: CLINIC | Age: 72
End: 2022-08-01

## 2022-08-01 ENCOUNTER — NON-APPOINTMENT (OUTPATIENT)
Age: 72
End: 2022-08-01

## 2022-08-01 VITALS — HEIGHT: 71 IN | WEIGHT: 250 LBS | BODY MASS INDEX: 35 KG/M2

## 2022-08-01 PROCEDURE — 99214 OFFICE O/P EST MOD 30 MIN: CPT

## 2022-08-02 NOTE — HISTORY OF PRESENT ILLNESS
[FreeTextEntry1] : Mr. Stephenson is a 71 year old male who underwent a ureteroscopy and laser lithotripsy with stent placement who presents after having difficulty with string stent. Pt was concerned that stent had moved.

## 2022-08-02 NOTE — ASSESSMENT
[FreeTextEntry1] : String stent intact. Pt is not leaking any urine. After a long discussion and given the patient pros and cons of removing stent today, pt has decided to leave stent in until Thursday. String was taped to penis with tegaderm. If he has further issues with string stent then he will let us know. All of his questions were otherwise answered. \par \par Agree with above we had a long discussion about this issue went over the benefits and risks of removing the stent now versus waiting till Thursday.  The patient will return on Thursday to have it removed.  Total time spent with him today was about 30 minutes between myself and Luanne Raza.  He also will need to follow-up with Dr. Mario which is remarkable for partial nephrectomy.

## 2022-08-04 ENCOUNTER — APPOINTMENT (OUTPATIENT)
Dept: UROLOGY | Facility: CLINIC | Age: 72
End: 2022-08-04

## 2022-08-04 VITALS
BODY MASS INDEX: 34.3 KG/M2 | DIASTOLIC BLOOD PRESSURE: 86 MMHG | SYSTOLIC BLOOD PRESSURE: 134 MMHG | RESPIRATION RATE: 16 BRPM | WEIGHT: 245 LBS | OXYGEN SATURATION: 94 % | HEART RATE: 79 BPM | HEIGHT: 71 IN

## 2022-08-04 DIAGNOSIS — N20.0 CALCULUS OF KIDNEY: ICD-10-CM

## 2022-08-04 LAB
APPEARANCE: NORMAL
BILIRUBIN URINE: NORMAL
BLOOD URINE: NORMAL
COLOR: NORMAL
GLUCOSE QUALITATIVE U: NEGATIVE
KETONES URINE: NORMAL
NITRITE URINE: NEGATIVE
PH UR STRIP: 5
PH URINE: 5
PROTEIN URINE: 2000
SPECIFIC GRAVITY URINE: 1.02
UROBILINOGEN URINE: 0.2

## 2022-08-04 PROCEDURE — 99214 OFFICE O/P EST MOD 30 MIN: CPT

## 2022-08-04 RX ORDER — BUDESONIDE AND FORMOTEROL FUMARATE DIHYDRATE 160; 4.5 UG/1; UG/1
160-4.5 AEROSOL RESPIRATORY (INHALATION)
Qty: 10 | Refills: 0 | Status: DISCONTINUED | COMMUNITY
Start: 2022-04-18 | End: 2022-08-04

## 2022-08-04 RX ORDER — FAMOTIDINE 20 MG/1
20 TABLET, FILM COATED ORAL
Refills: 0 | Status: DISCONTINUED | COMMUNITY
End: 2022-08-04

## 2022-08-04 NOTE — HISTORY OF PRESENT ILLNESS
[FreeTextEntry1] : 71-year-old male with a history of a stroke 3 months ago who also had a stone and was stented.  He underwent ureteroscopy and laser lithotripsy recently and today comes in for stent removal.  He is overall been doing well.  His stent was removed today without difficulty.  We went over the follow-up for the stone disease and also had a long discussion about his left renal mass.  I discussed and reviewed the CT scan with him and we also discussed the findings on the MRI.  I think he would be a good candidate for a partial nephrectomy and while there may be some risk of bleeding due to his use of Eliquis which may occur postop I think this likely would still be the better course for him then to have a total nephrectomy.  I have given all this information to one of my partners who will be setting him up for an appointment within the next 2 weeks and discussed the options with him and make a final decision about what type of surgery to have.  We also discussed the issues that he has had some pulmonary nodules but these have been stable for a period of time and are closely followed by his pulmonologist.  None of them meet the criteria for pathologic lymph nodes at this point.

## 2022-08-04 NOTE — ASSESSMENT
[FreeTextEntry1] : Patient stent today was removed without difficulty.  We went over his follow-up for the stone disease and he will obtain an ultrasound and I will let him know those results.  He can otherwise follow-up with me in 6 months regarding that.  Our longer discussion was regarding the left renal mass.  I answered both he and his wife's questions in detail and they understand the situation.  They will be following up in the near future at the [address] with us if they still have further questions they will contact me.  Total time spent on the visit today was 35 minutes.

## 2022-08-04 NOTE — PHYSICAL EXAM
[General Appearance - Well Nourished] : well nourished [Normal Appearance] : normal appearance [General Appearance - In No Acute Distress] : no acute distress [Abdomen Soft] : soft [Abdomen Tenderness] : non-tender [Abdomen Mass (___ Cm)] : no abdominal mass palpated

## 2022-08-05 ENCOUNTER — OUTPATIENT (OUTPATIENT)
Dept: OUTPATIENT SERVICES | Facility: HOSPITAL | Age: 72
LOS: 1 days | Discharge: HOME | End: 2022-08-05

## 2022-08-05 ENCOUNTER — RESULT REVIEW (OUTPATIENT)
Age: 72
End: 2022-08-05

## 2022-08-05 DIAGNOSIS — N20.0 CALCULUS OF KIDNEY: ICD-10-CM

## 2022-08-05 DIAGNOSIS — N28.89 OTHER SPECIFIED DISORDERS OF KIDNEY AND URETER: ICD-10-CM

## 2022-08-05 DIAGNOSIS — Z98.890 OTHER SPECIFIED POSTPROCEDURAL STATES: Chronic | ICD-10-CM

## 2022-08-05 PROCEDURE — 76770 US EXAM ABDO BACK WALL COMP: CPT | Mod: 26

## 2022-08-08 LAB — BACTERIA UR CULT: NORMAL

## 2022-08-15 ENCOUNTER — APPOINTMENT (OUTPATIENT)
Dept: UROLOGY | Facility: CLINIC | Age: 72
End: 2022-08-15

## 2022-08-15 VITALS
WEIGHT: 247 LBS | HEART RATE: 73 BPM | DIASTOLIC BLOOD PRESSURE: 85 MMHG | HEIGHT: 71 IN | BODY MASS INDEX: 34.58 KG/M2 | SYSTOLIC BLOOD PRESSURE: 139 MMHG | TEMPERATURE: 98 F

## 2022-08-15 PROCEDURE — 99215 OFFICE O/P EST HI 40 MIN: CPT

## 2022-08-15 NOTE — ASSESSMENT
[FreeTextEntry1] : MELL SANTILLAN is a 71 year old male hx of COPD, AFIB on Eliquis with a CVA on 04/2022 s/p URS/LL stent insertion for right ureteral stone 05/2022 who presents for consultation for left renal mass identified incidentally during this episode of obstructing right ureteral stone. Persistent microhematuria.\par \par Patient elects undergo left robotic partial nephrectomy, possible radical nephrectomy.\par  We discussed the risks and benefits of the procedure  . All questions and concerns posed by both the patient and his wife were answered . \par He is at higher risk of bleeding as he is on anticoagulation he will discuss perioperative management of his anticoagulation with his physicians. As per below discussion other options were discussed with pt including active surveillance which was not recommended for a mass of the size as well as ablative techniques. radical nephrectomy also discussed and pt prefers partial nephrectomy, and is aware of the possibility for radical nephrectomy.\par \par PLan \par U/A, UCX & cytology \par Pt is getting a chest CT in the next 2 weeks as per pulm to reassess nodules.\par f/u in 3 weeks to review CT scan and for cystoscopy to complete microhematuria w/u. \par pt to bring mri disc from [facility]\par \par \par Renal mass discussion:\par The dx of renal mass was discussed in great detail.\par \par We discussed the fact that enhancement within a renal mass suggests malignancy, but that a benign renal tumor cannot be excluded.\par \par Overall the risks of CA appears to be about 80%.\par \par We discussed the problems with renal bx, the limited indications, and the need to treat based primarily on radiographic findings.  The patient appears to understand that there is about a 20% chance that this mass may be benign. We discussed the pros and cons of renal mass biopsy.\par We also discussed that active surveillance of renal masses (typically <3 cm) is a perfectly reasonable option as stated in the AUA guidelines. \par \par Regarding treatment, we discussed radical nephrectomy vs. partial nephrectomy.\par With respect to P Nx we discussed a potential advantage with renal function long term.  We discussed the risk risk of urinoma, bleeding, infection, possible need for reoperation, local recurrence.  We also discussed the potential need for Rad Nx dependent on intraoperative findings.  For both R Nx and P Nx, we discussed risk of any major surgery, including but not limited to MI, CVA, DVT, infection, blood transfusion, wound healing problems, injury to surrounding structures (i.e. including but not limited to bowel or other adjacent organs), positioning injuries.  All of these issues were reviewed.  We discussed risk of cancer recurrence and potential need for additional therapy.  We also discussed risk of renal insufficiency and dialysis short and long-term with R Nx.  For partial nephrectomy, we discussed possible need to convert to R Nx dependent on intraoperative findings and anatomy.\par \par We also discussed open vs. laparoscopic/robotic surgery and advantages and disadvantages each way. For laparoscopic/robotic surgery, we discussed possibility that conversion to open surgery might be required dependent on intraoperative findings and anatomy.  \par \par We also discussed renal ablative therapies such as cryo Rx and RFA.  We reviewed the data for these modalities and the overall encouraging findings thus far, but also discussed the fact that long-term cancer control issues remain unproven due to the still somewhat novel status of these modalities.\par \par In summary, we discussed the procedure, risks, potential benefits, and reasonable alternatives. All questions were answered.  \par I asked this patient to call if any additional questions or concerns.\par

## 2022-08-15 NOTE — PHYSICAL EXAM
[General Appearance - Well Developed] : well developed [General Appearance - Well Nourished] : well nourished [Normal Appearance] : normal appearance [Well Groomed] : well groomed [General Appearance - In No Acute Distress] : no acute distress [Abdomen Soft] : soft [Abdomen Tenderness] : non-tender [Costovertebral Angle Tenderness] : no ~M costovertebral angle tenderness [FreeTextEntry1] : moderately protuberant abdomen , no scars  [Edema] : no peripheral edema [] : no respiratory distress [Respiration, Rhythm And Depth] : normal respiratory rhythm and effort [Exaggerated Use Of Accessory Muscles For Inspiration] : no accessory muscle use [Oriented To Time, Place, And Person] : oriented to person, place, and time [Affect] : the affect was normal [Mood] : the mood was normal [Not Anxious] : not anxious [Normal Station and Gait] : the gait and station were normal for the patient's age [No Focal Deficits] : no focal deficits [No Palpable Adenopathy] : no palpable adenopathy

## 2022-08-15 NOTE — HISTORY OF PRESENT ILLNESS
[FreeTextEntry1] : MELL SANTILLAN is a 71 year old male hx of COPD, AFIB on Eliquis with a CVA on 04/2022 s/p URS/LL stent insertion for right ureteral stone 05/2022 who presents for consultation for left renal mass identified incidentally during this episode of obstructing right ureteral stone.\par \par Pt denies gross hematuria, dysuria or associated symptoms. \par Had CVA 04/2022   No deficits damage but speech issue right after which improved on its own . He has lost weight intentionally , about 45 pounds \par \par MRI abdomen pelvis images visualized 06/29/22 : \par Partially necrotic enhancing well solid mass measuring 4.6 cm posteriorly at the upper pole , the left kidney consistent with renal neoplasm .There is an exophytic non enhancing cyst LLP 2.3 cm \par \par CT abdomen pelvis with IV contrast (when Pt presented with ureteral stone) 05/22 images visualized \par 4.7 cm LUP renal mass , bilateral fat containing hernia and umbilical hernia . Single renal artery and single renal vein \par \par RBUS images 08/08/22 :\par Fullness of the right renal collecting system.\par Left upper pole renal mass seen on CT of 5/7/2022 is not well evaluated on this sonogram.\par Enlarged prostate 53 ml \par \par July 2022 labs\par creatinine 0.6\par GFR -104 \par U/A - microhematuria x2 \par \par denies other  PMH including previous kidney stones, recurrent UTIs. \par Family History: No  malignancies\par Social History: former smoker\par PSH remote anal surgery for fissure, no abdominal surgey \par

## 2022-08-16 ENCOUNTER — NON-APPOINTMENT (OUTPATIENT)
Age: 72
End: 2022-08-16

## 2022-08-17 ENCOUNTER — NON-APPOINTMENT (OUTPATIENT)
Age: 72
End: 2022-08-17

## 2022-08-17 LAB — BACTERIA UR CULT: NORMAL

## 2022-08-22 ENCOUNTER — NON-APPOINTMENT (OUTPATIENT)
Age: 72
End: 2022-08-22

## 2022-08-22 LAB — URINE CYTOLOGY: NORMAL

## 2022-09-07 ENCOUNTER — OUTPATIENT (OUTPATIENT)
Dept: OUTPATIENT SERVICES | Facility: HOSPITAL | Age: 72
LOS: 1 days | Discharge: HOME | End: 2022-09-07

## 2022-09-07 VITALS
TEMPERATURE: 98 F | OXYGEN SATURATION: 98 % | HEART RATE: 78 BPM | SYSTOLIC BLOOD PRESSURE: 137 MMHG | WEIGHT: 246.92 LBS | RESPIRATION RATE: 20 BRPM | HEIGHT: 71 IN | DIASTOLIC BLOOD PRESSURE: 83 MMHG

## 2022-09-07 DIAGNOSIS — Z01.818 ENCOUNTER FOR OTHER PREPROCEDURAL EXAMINATION: ICD-10-CM

## 2022-09-07 DIAGNOSIS — N28.89 OTHER SPECIFIED DISORDERS OF KIDNEY AND URETER: ICD-10-CM

## 2022-09-07 DIAGNOSIS — Z98.890 OTHER SPECIFIED POSTPROCEDURAL STATES: Chronic | ICD-10-CM

## 2022-09-07 LAB
ALBUMIN SERPL ELPH-MCNC: 4.3 G/DL — SIGNIFICANT CHANGE UP (ref 3.5–5.2)
ALP SERPL-CCNC: 132 U/L — HIGH (ref 30–115)
ALT FLD-CCNC: 26 U/L — SIGNIFICANT CHANGE UP (ref 0–41)
ANION GAP SERPL CALC-SCNC: 12 MMOL/L — SIGNIFICANT CHANGE UP (ref 7–14)
APPEARANCE UR: CLEAR — SIGNIFICANT CHANGE UP
APTT BLD: 34.2 SEC — SIGNIFICANT CHANGE UP (ref 27–39.2)
AST SERPL-CCNC: 26 U/L — SIGNIFICANT CHANGE UP (ref 0–41)
BASOPHILS # BLD AUTO: 0.04 K/UL — SIGNIFICANT CHANGE UP (ref 0–0.2)
BASOPHILS NFR BLD AUTO: 0.4 % — SIGNIFICANT CHANGE UP (ref 0–1)
BILIRUB SERPL-MCNC: 0.4 MG/DL — SIGNIFICANT CHANGE UP (ref 0.2–1.2)
BILIRUB UR-MCNC: NEGATIVE — SIGNIFICANT CHANGE UP
BLD GP AB SCN SERPL QL: SIGNIFICANT CHANGE UP
BUN SERPL-MCNC: 19 MG/DL — SIGNIFICANT CHANGE UP (ref 10–20)
CALCIUM SERPL-MCNC: 9.2 MG/DL — SIGNIFICANT CHANGE UP (ref 8.5–10.1)
CHLORIDE SERPL-SCNC: 98 MMOL/L — SIGNIFICANT CHANGE UP (ref 98–110)
CO2 SERPL-SCNC: 27 MMOL/L — SIGNIFICANT CHANGE UP (ref 17–32)
COLOR SPEC: YELLOW — SIGNIFICANT CHANGE UP
CREAT SERPL-MCNC: 0.8 MG/DL — SIGNIFICANT CHANGE UP (ref 0.7–1.5)
DIFF PNL FLD: NEGATIVE — SIGNIFICANT CHANGE UP
EGFR: 96 ML/MIN/1.73M2 — SIGNIFICANT CHANGE UP
EOSINOPHIL # BLD AUTO: 0.17 K/UL — SIGNIFICANT CHANGE UP (ref 0–0.7)
EOSINOPHIL NFR BLD AUTO: 1.6 % — SIGNIFICANT CHANGE UP (ref 0–8)
GLUCOSE SERPL-MCNC: 89 MG/DL — SIGNIFICANT CHANGE UP (ref 70–99)
GLUCOSE UR QL: NEGATIVE — SIGNIFICANT CHANGE UP
HCT VFR BLD CALC: 42.5 % — SIGNIFICANT CHANGE UP (ref 42–52)
HGB BLD-MCNC: 13.3 G/DL — LOW (ref 14–18)
IMM GRANULOCYTES NFR BLD AUTO: 0.3 % — SIGNIFICANT CHANGE UP (ref 0.1–0.3)
INR BLD: 1.35 RATIO — HIGH (ref 0.65–1.3)
KETONES UR-MCNC: NEGATIVE — SIGNIFICANT CHANGE UP
LEUKOCYTE ESTERASE UR-ACNC: NEGATIVE — SIGNIFICANT CHANGE UP
LYMPHOCYTES # BLD AUTO: 1.84 K/UL — SIGNIFICANT CHANGE UP (ref 1.2–3.4)
LYMPHOCYTES # BLD AUTO: 17.8 % — LOW (ref 20.5–51.1)
MCHC RBC-ENTMCNC: 26.8 PG — LOW (ref 27–31)
MCHC RBC-ENTMCNC: 31.3 G/DL — LOW (ref 32–37)
MCV RBC AUTO: 85.5 FL — SIGNIFICANT CHANGE UP (ref 80–94)
MONOCYTES # BLD AUTO: 0.99 K/UL — HIGH (ref 0.1–0.6)
MONOCYTES NFR BLD AUTO: 9.6 % — HIGH (ref 1.7–9.3)
NEUTROPHILS # BLD AUTO: 7.27 K/UL — HIGH (ref 1.4–6.5)
NEUTROPHILS NFR BLD AUTO: 70.3 % — SIGNIFICANT CHANGE UP (ref 42.2–75.2)
NITRITE UR-MCNC: NEGATIVE — SIGNIFICANT CHANGE UP
NRBC # BLD: 0 /100 WBCS — SIGNIFICANT CHANGE UP (ref 0–0)
PH UR: 6 — SIGNIFICANT CHANGE UP (ref 5–8)
PLATELET # BLD AUTO: 243 K/UL — SIGNIFICANT CHANGE UP (ref 130–400)
POTASSIUM SERPL-MCNC: 4.5 MMOL/L — SIGNIFICANT CHANGE UP (ref 3.5–5)
POTASSIUM SERPL-SCNC: 4.5 MMOL/L — SIGNIFICANT CHANGE UP (ref 3.5–5)
PROT SERPL-MCNC: 6.8 G/DL — SIGNIFICANT CHANGE UP (ref 6–8)
PROT UR-MCNC: SIGNIFICANT CHANGE UP
PROTHROM AB SERPL-ACNC: 15.5 SEC — HIGH (ref 9.95–12.87)
RBC # BLD: 4.97 M/UL — SIGNIFICANT CHANGE UP (ref 4.7–6.1)
RBC # FLD: 13.9 % — SIGNIFICANT CHANGE UP (ref 11.5–14.5)
SODIUM SERPL-SCNC: 137 MMOL/L — SIGNIFICANT CHANGE UP (ref 135–146)
SP GR SPEC: 1.03 — SIGNIFICANT CHANGE UP (ref 1.01–1.03)
UROBILINOGEN FLD QL: SIGNIFICANT CHANGE UP
WBC # BLD: 10.34 K/UL — SIGNIFICANT CHANGE UP (ref 4.8–10.8)
WBC # FLD AUTO: 10.34 K/UL — SIGNIFICANT CHANGE UP (ref 4.8–10.8)

## 2022-09-07 RX ORDER — ENOXAPARIN SODIUM 100 MG/ML
0 INJECTION SUBCUTANEOUS
Qty: 0 | Refills: 0 | DISCHARGE

## 2022-09-07 NOTE — H&P PST ADULT - REASON FOR ADMISSION
Patient is a  71 year old  male presenting to PAST in preparation for LEFT ROBOTIC PARTIAL NEPHRECTOMY POSSIBLE RADICAL NEPHRECTOMY  on 9/28/22  under general anesthesia by Dr. huston

## 2022-09-07 NOTE — H&P PST ADULT - HISTORY OF PRESENT ILLNESS
pt with renal mass left side  now for planned procedure     besides sx of renal ca  per pt no interim medical changes since last past visit 7/22    PATIENT CURRENTLY DENIES CHEST PAIN  SHORTNESS OF BREATH  PALPITATIONS,  DYSURIA, OR UPPER RESPIRATORY INFECTION IN PAST 2 WEEKS  EXERCISE  TOLERANCE  1-2 FLIGHT OF STAIRS  WITHOUT SHORTNESS OF BREATH  denies travel outside the [place] in the past 30 days  Patient denies any signs or symptoms of COVID 19 and denies contact with known positive individuals.  They have an appointment for repeat COVID testing pre-procedure and acknowledge its time and place.  They were instructed to quarantine pre-procedure, practice exposure control measures, continue to self-monitor and report any concerns to their proceduralist.  pt advised self quarantine till day of procedure  Anesthesia Alert  Difficult Airway class IV  NO--History of neck surgery or radiation  NO--Limited ROM of neck  NO--History of Malignant hyperthermia  NO--No personal or family history of Pseudocholinesterase deficiency.  NO--Prior Anesthesia Complication  NO--Latex Allergy  NO--Loose teeth  NO--History of Rheumatoid Arthritis  NO--Bleeding risk  + VICKI  NO--Other_____    PT DENIES ANY RASHES, ABRASION, OR OPEN WOUNDS OR CUTS    AS PER THE PT, THIS IS HIS/HER COMPLETE MEDICAL AND SURGICAL HX, INCLUDING MEDICATIONS PRESCRIBED AND OVER THE COUNTER    Patient verbalized understanding of instructions and was given the opportunity to ask questions and have them answered.    pt denies any suicidal ideation or thoughts, pt states not a threat to self or others    N28.89/23827,66535    Other specified disorders of kidney and ureter    Encounter for other preprocedural examination    ^N28.89/76721,96011    Other specified disorders of kidney and ureter    Encounter for other preprocedural examination    VICKI on CPAP    Cerebrovascular accident (CVA)    Calculus, renal    Afib    Ex-smoker for more than 1 year    History of surgery

## 2022-09-09 ENCOUNTER — NON-APPOINTMENT (OUTPATIENT)
Age: 72
End: 2022-09-09

## 2022-09-12 ENCOUNTER — APPOINTMENT (OUTPATIENT)
Dept: CARDIOLOGY | Facility: CLINIC | Age: 72
End: 2022-09-12

## 2022-09-12 ENCOUNTER — NON-APPOINTMENT (OUTPATIENT)
Age: 72
End: 2022-09-12

## 2022-09-13 ENCOUNTER — APPOINTMENT (OUTPATIENT)
Dept: UROLOGY | Facility: CLINIC | Age: 72
End: 2022-09-13

## 2022-09-13 DIAGNOSIS — N40.1 BENIGN PROSTATIC HYPERPLASIA WITH LOWER URINARY TRACT SYMPMS: ICD-10-CM

## 2022-09-13 DIAGNOSIS — N13.8 BENIGN PROSTATIC HYPERPLASIA WITH LOWER URINARY TRACT SYMPMS: ICD-10-CM

## 2022-09-13 DIAGNOSIS — N28.89 OTHER SPECIFIED DISORDERS OF KIDNEY AND URETER: ICD-10-CM

## 2022-09-13 DIAGNOSIS — R31.29 OTHER MICROSCOPIC HEMATURIA: ICD-10-CM

## 2022-09-13 PROCEDURE — 99214 OFFICE O/P EST MOD 30 MIN: CPT | Mod: 25

## 2022-09-13 PROCEDURE — 52000 CYSTOURETHROSCOPY: CPT

## 2022-09-13 NOTE — HISTORY OF PRESENT ILLNESS
[FreeTextEntry1] : MELL SANTILLAN is a 71 year old male hx of COPD, AFIB on Eliquis with a CVA on 04/2022 s/p URS/LL stent insertion for right ureteral stone 05/2022 who presents for consultation for left renal mass identified incidentally during this episode of obstructing right ureteral stone. Persistent microhematuria.\par \par PT is here with his wife and presents today for cystsocopy as per microhemauria wokrup \par \par CT chest w/o iv contrast images from 08/30/22 -\par fairly extensive calcified pleural plaque formation right hemithorax including the right base with some pleural plaque posteriorly on the right , all unchanged . multiple areas of scarring of the right unchanged. Numerous calcified granulomata in both lungs and calcified mediastinal nodes , unchanged due to old granulomatous disease . \par \par previously \par Pt denies gross hematuria, dysuria or associated symptoms. \par Had CVA 04/2022   No deficits damage but speech issue right after which improved on its own . He has lost weight intentionally , about 45 pounds \par \par MRI abdomen pelvis images visualized 06/29/22 : \par Partially necrotic enhancing well solid mass measuring 4.6 cm posteriorly at the upper pole , the left kidney consistent with renal neoplasm .There is an exophytic non enhancing cyst LLP 2.3 cm \par \par CT abdomen pelvis with IV contrast (when Pt presented with ureteral stone) 05/22 images visualized \par 4.7 cm LUP renal mass , bilateral fat containing hernia and umbilical hernia . Single renal artery and single renal vein \par \par RBUS images 08/08/22 :\par Fullness of the right renal collecting system.\par Left upper pole renal mass seen on CT of 5/7/2022 is not well evaluated on this sonogram.\par Enlarged prostate 53 ml \par \par July 2022 labs\par creatinine 0.6\par GFR -104 \par U/A - microhematuria x2 \par \par denies other  PMH including previous kidney stones, recurrent UTIs. \par Family History: No  malignancies\par Social History: former smoker\par PSH remote anal surgery for fissure, no abdominal surgey \par

## 2022-09-13 NOTE — ADDENDUM
[FreeTextEntry1] : Patient's note was transcribed with the assistance of a medical scribe under the supervision of Dr. Kaplan.\par I, Dr. Kaplan, have reviewed the patient's chart and agree that it aligns with my medical decisions.\par Rosenda Collado, our scribe, also served as a chaperone for physical examination purposes.\par \par \par

## 2022-09-13 NOTE — ASSESSMENT
[FreeTextEntry1] : MELL SANTILLAN is a 71 year old male hx of COPD, AFIB on Eliquis with a CVA on 04/2022 s/p URS/LL stent insertion for right ureteral stone 05/2022 who presents for consultation for left renal mass identified incidentally during this episode of obstructing right ureteral stone. Persistent microhematuria status post negative work-up aside from BPH\par \par Patient elects to undergo left robotic partial nephrectomy, possible radical nephrectomy.\par We answer more questions with the patient and his wife regarding surgery.  They have received clearance to stop anticoagulation 3 days prior.\par He had a positive culture on pretesting and is now started antibiotics he is asymptomatic.\par CT chest without any metastatic disease though prior plaque to be evaluated by pulmonology.\par pt to bring mri disc from regional \par Watchful waiting of BPH\par

## 2022-09-16 ENCOUNTER — APPOINTMENT (OUTPATIENT)
Dept: CARDIOLOGY | Facility: CLINIC | Age: 72
End: 2022-09-16

## 2022-09-25 ENCOUNTER — LABORATORY RESULT (OUTPATIENT)
Age: 72
End: 2022-09-25

## 2022-09-26 ENCOUNTER — NON-APPOINTMENT (OUTPATIENT)
Age: 72
End: 2022-09-26

## 2022-09-27 NOTE — ASU PATIENT PROFILE, ADULT - FALL HARM RISK - UNIVERSAL INTERVENTIONS
Bed in lowest position, wheels locked, appropriate side rails in place/Call bell, personal items and telephone in reach/Instruct patient to call for assistance before getting out of bed or chair/Non-slip footwear when patient is out of bed/[place] to call system/Physically safe environment - no spills, clutter or unnecessary equipment/Purposeful Proactive Rounding/Room/bathroom lighting operational, light cord in reach

## 2022-09-28 ENCOUNTER — RESULT REVIEW (OUTPATIENT)
Age: 72
End: 2022-09-28

## 2022-09-28 ENCOUNTER — INPATIENT (INPATIENT)
Facility: HOSPITAL | Age: 72
LOS: 0 days | Discharge: HOME | End: 2022-09-29
Attending: UROLOGY | Admitting: UROLOGY

## 2022-09-28 ENCOUNTER — OUTPATIENT (OUTPATIENT)
Dept: OUTPATIENT SERVICES | Facility: HOSPITAL | Age: 72
LOS: 1 days | Discharge: HOME | End: 2022-09-28

## 2022-09-28 ENCOUNTER — TRANSCRIPTION ENCOUNTER (OUTPATIENT)
Age: 72
End: 2022-09-28

## 2022-09-28 ENCOUNTER — APPOINTMENT (OUTPATIENT)
Dept: UROLOGY | Facility: HOSPITAL | Age: 72
End: 2022-09-28

## 2022-09-28 VITALS
HEART RATE: 75 BPM | HEIGHT: 71 IN | WEIGHT: 246.92 LBS | TEMPERATURE: 98 F | DIASTOLIC BLOOD PRESSURE: 81 MMHG | SYSTOLIC BLOOD PRESSURE: 121 MMHG | RESPIRATION RATE: 15 BRPM | OXYGEN SATURATION: 94 %

## 2022-09-28 DIAGNOSIS — Z98.890 OTHER SPECIFIED POSTPROCEDURAL STATES: Chronic | ICD-10-CM

## 2022-09-28 DIAGNOSIS — N28.89 OTHER SPECIFIED DISORDERS OF KIDNEY AND URETER: ICD-10-CM

## 2022-09-28 LAB
ANION GAP SERPL CALC-SCNC: 10 MMOL/L — SIGNIFICANT CHANGE UP (ref 7–14)
BASOPHILS # BLD AUTO: 0.03 K/UL — SIGNIFICANT CHANGE UP (ref 0–0.2)
BASOPHILS NFR BLD AUTO: 0.2 % — SIGNIFICANT CHANGE UP (ref 0–1)
BUN SERPL-MCNC: 19 MG/DL — SIGNIFICANT CHANGE UP (ref 10–20)
CALCIUM SERPL-MCNC: 8.1 MG/DL — LOW (ref 8.4–10.4)
CHLORIDE SERPL-SCNC: 103 MMOL/L — SIGNIFICANT CHANGE UP (ref 98–110)
CO2 SERPL-SCNC: 26 MMOL/L — SIGNIFICANT CHANGE UP (ref 17–32)
CREAT SERPL-MCNC: 0.8 MG/DL — SIGNIFICANT CHANGE UP (ref 0.7–1.5)
EGFR: 96 ML/MIN/1.73M2 — SIGNIFICANT CHANGE UP
EOSINOPHIL # BLD AUTO: 0.01 K/UL — SIGNIFICANT CHANGE UP (ref 0–0.7)
EOSINOPHIL NFR BLD AUTO: 0.1 % — SIGNIFICANT CHANGE UP (ref 0–8)
GLUCOSE SERPL-MCNC: 156 MG/DL — HIGH (ref 70–99)
HCT VFR BLD CALC: 38.1 % — LOW (ref 42–52)
HGB BLD-MCNC: 12.3 G/DL — LOW (ref 14–18)
IMM GRANULOCYTES NFR BLD AUTO: 0.6 % — HIGH (ref 0.1–0.3)
LYMPHOCYTES # BLD AUTO: 1.14 K/UL — LOW (ref 1.2–3.4)
LYMPHOCYTES # BLD AUTO: 6.5 % — LOW (ref 20.5–51.1)
MCHC RBC-ENTMCNC: 27.3 PG — SIGNIFICANT CHANGE UP (ref 27–31)
MCHC RBC-ENTMCNC: 32.3 G/DL — SIGNIFICANT CHANGE UP (ref 32–37)
MCV RBC AUTO: 84.5 FL — SIGNIFICANT CHANGE UP (ref 80–94)
MONOCYTES # BLD AUTO: 1.09 K/UL — HIGH (ref 0.1–0.6)
MONOCYTES NFR BLD AUTO: 6.2 % — SIGNIFICANT CHANGE UP (ref 1.7–9.3)
NEUTROPHILS # BLD AUTO: 15.27 K/UL — HIGH (ref 1.4–6.5)
NEUTROPHILS NFR BLD AUTO: 86.4 % — HIGH (ref 42.2–75.2)
NRBC # BLD: 0 /100 WBCS — SIGNIFICANT CHANGE UP (ref 0–0)
PLATELET # BLD AUTO: 217 K/UL — SIGNIFICANT CHANGE UP (ref 130–400)
POTASSIUM SERPL-MCNC: 3.8 MMOL/L — SIGNIFICANT CHANGE UP (ref 3.5–5)
POTASSIUM SERPL-SCNC: 3.8 MMOL/L — SIGNIFICANT CHANGE UP (ref 3.5–5)
RBC # BLD: 4.51 M/UL — LOW (ref 4.7–6.1)
RBC # FLD: 15.1 % — HIGH (ref 11.5–14.5)
SODIUM SERPL-SCNC: 139 MMOL/L — SIGNIFICANT CHANGE UP (ref 135–146)
WBC # BLD: 17.65 K/UL — HIGH (ref 4.8–10.8)
WBC # FLD AUTO: 17.65 K/UL — HIGH (ref 4.8–10.8)

## 2022-09-28 PROCEDURE — 88304 TISSUE EXAM BY PATHOLOGIST: CPT | Mod: 26

## 2022-09-28 PROCEDURE — 50543 LAPARO PARTIAL NEPHRECTOMY: CPT | Mod: AS,LT

## 2022-09-28 PROCEDURE — 88307 TISSUE EXAM BY PATHOLOGIST: CPT | Mod: 26

## 2022-09-28 PROCEDURE — 76998 US GUIDE INTRAOP: CPT | Mod: 26

## 2022-09-28 PROCEDURE — 50543 LAPARO PARTIAL NEPHRECTOMY: CPT | Mod: LT

## 2022-09-28 PROCEDURE — S2900 ROBOTIC SURGICAL SYSTEM: CPT | Mod: NC

## 2022-09-28 PROCEDURE — 50541 LAPARO ABLATE RENAL CYST: CPT | Mod: 59,LT

## 2022-09-28 PROCEDURE — 50541 LAPARO ABLATE RENAL CYST: CPT | Mod: AS,LT,59

## 2022-09-28 RX ORDER — ONDANSETRON 8 MG/1
4 TABLET, FILM COATED ORAL EVERY 6 HOURS
Refills: 0 | Status: DISCONTINUED | OUTPATIENT
Start: 2022-09-28 | End: 2022-10-12

## 2022-09-28 RX ORDER — HYDROMORPHONE HYDROCHLORIDE 2 MG/ML
0.5 INJECTION INTRAMUSCULAR; INTRAVENOUS; SUBCUTANEOUS
Refills: 0 | Status: DISCONTINUED | OUTPATIENT
Start: 2022-09-28 | End: 2022-09-29

## 2022-09-28 RX ORDER — SENNA PLUS 8.6 MG/1
2 TABLET ORAL AT BEDTIME
Refills: 0 | Status: DISCONTINUED | OUTPATIENT
Start: 2022-09-28 | End: 2022-10-12

## 2022-09-28 RX ORDER — ACETAMINOPHEN 500 MG
1000 TABLET ORAL EVERY 6 HOURS
Refills: 0 | Status: DISCONTINUED | OUTPATIENT
Start: 2022-09-28 | End: 2022-09-28

## 2022-09-28 RX ORDER — MULTIVIT-MIN/FERROUS GLUCONATE 9 MG/15 ML
1 LIQUID (ML) ORAL
Qty: 0 | Refills: 0 | DISCHARGE

## 2022-09-28 RX ORDER — BUPIVACAINE 13.3 MG/ML
20 INJECTION, SUSPENSION, LIPOSOMAL INFILTRATION ONCE
Refills: 0 | Status: DISCONTINUED | OUTPATIENT
Start: 2022-09-28 | End: 2022-09-29

## 2022-09-28 RX ORDER — CEFAZOLIN SODIUM 1 G
2000 VIAL (EA) INJECTION EVERY 8 HOURS
Refills: 0 | Status: COMPLETED | OUTPATIENT
Start: 2022-09-28 | End: 2022-09-28

## 2022-09-28 RX ORDER — BUDESONIDE AND FORMOTEROL FUMARATE DIHYDRATE 160; 4.5 UG/1; UG/1
2 AEROSOL RESPIRATORY (INHALATION)
Refills: 0 | Status: DISCONTINUED | OUTPATIENT
Start: 2022-09-28 | End: 2022-10-12

## 2022-09-28 RX ORDER — ACETAMINOPHEN 500 MG
1000 TABLET ORAL ONCE
Refills: 0 | Status: DISCONTINUED | OUTPATIENT
Start: 2022-09-28 | End: 2022-09-28

## 2022-09-28 RX ORDER — ACETAMINOPHEN 500 MG
650 TABLET ORAL EVERY 6 HOURS
Refills: 0 | Status: DISCONTINUED | OUTPATIENT
Start: 2022-09-28 | End: 2022-10-12

## 2022-09-28 RX ORDER — ACETAMINOPHEN 500 MG
650 TABLET ORAL EVERY 6 HOURS
Refills: 0 | Status: DISCONTINUED | OUTPATIENT
Start: 2022-09-28 | End: 2022-09-28

## 2022-09-28 RX ORDER — OXYCODONE HYDROCHLORIDE 5 MG/1
5 TABLET ORAL EVERY 6 HOURS
Refills: 0 | Status: DISCONTINUED | OUTPATIENT
Start: 2022-09-28 | End: 2022-09-29

## 2022-09-28 RX ORDER — ATORVASTATIN CALCIUM 80 MG/1
80 TABLET, FILM COATED ORAL AT BEDTIME
Refills: 0 | Status: DISCONTINUED | OUTPATIENT
Start: 2022-09-28 | End: 2022-10-12

## 2022-09-28 RX ORDER — PANTOPRAZOLE SODIUM 20 MG/1
40 TABLET, DELAYED RELEASE ORAL
Refills: 0 | Status: DISCONTINUED | OUTPATIENT
Start: 2022-09-28 | End: 2022-10-12

## 2022-09-28 RX ORDER — ACETAMINOPHEN 500 MG
1000 TABLET ORAL ONCE
Refills: 0 | Status: DISCONTINUED | OUTPATIENT
Start: 2022-09-28 | End: 2022-09-29

## 2022-09-28 RX ORDER — ACETAMINOPHEN 500 MG
1000 TABLET ORAL ONCE
Refills: 0 | Status: COMPLETED | OUTPATIENT
Start: 2022-09-28 | End: 2022-09-28

## 2022-09-28 RX ORDER — SODIUM CHLORIDE 9 MG/ML
1000 INJECTION INTRAMUSCULAR; INTRAVENOUS; SUBCUTANEOUS
Refills: 0 | Status: DISCONTINUED | OUTPATIENT
Start: 2022-09-28 | End: 2022-09-29

## 2022-09-28 RX ADMIN — OXYCODONE HYDROCHLORIDE 5 MILLIGRAM(S): 5 TABLET ORAL at 19:42

## 2022-09-28 RX ADMIN — Medication 1000 MILLIGRAM(S): at 08:00

## 2022-09-28 RX ADMIN — SODIUM CHLORIDE 125 MILLILITER(S): 9 INJECTION INTRAMUSCULAR; INTRAVENOUS; SUBCUTANEOUS at 22:00

## 2022-09-28 RX ADMIN — HYDROMORPHONE HYDROCHLORIDE 0.5 MILLIGRAM(S): 2 INJECTION INTRAMUSCULAR; INTRAVENOUS; SUBCUTANEOUS at 12:45

## 2022-09-28 RX ADMIN — HYDROMORPHONE HYDROCHLORIDE 0.5 MILLIGRAM(S): 2 INJECTION INTRAMUSCULAR; INTRAVENOUS; SUBCUTANEOUS at 13:05

## 2022-09-28 RX ADMIN — HYDROMORPHONE HYDROCHLORIDE 0.5 MILLIGRAM(S): 2 INJECTION INTRAMUSCULAR; INTRAVENOUS; SUBCUTANEOUS at 12:55

## 2022-09-28 RX ADMIN — OXYCODONE HYDROCHLORIDE 5 MILLIGRAM(S): 5 TABLET ORAL at 19:00

## 2022-09-28 RX ADMIN — Medication 100 MILLIGRAM(S): at 16:55

## 2022-09-28 RX ADMIN — Medication 1000 MILLIGRAM(S): at 07:25

## 2022-09-28 RX ADMIN — Medication 100 MILLIGRAM(S): at 22:32

## 2022-09-28 RX ADMIN — ATORVASTATIN CALCIUM 80 MILLIGRAM(S): 80 TABLET, FILM COATED ORAL at 23:27

## 2022-09-28 RX ADMIN — SODIUM CHLORIDE 125 MILLILITER(S): 9 INJECTION INTRAMUSCULAR; INTRAVENOUS; SUBCUTANEOUS at 16:00

## 2022-09-28 RX ADMIN — BUDESONIDE AND FORMOTEROL FUMARATE DIHYDRATE 2 PUFF(S): 160; 4.5 AEROSOL RESPIRATORY (INHALATION) at 20:32

## 2022-09-28 RX ADMIN — Medication 400 MILLIGRAM(S): at 13:15

## 2022-09-28 RX ADMIN — HYDROMORPHONE HYDROCHLORIDE 0.5 MILLIGRAM(S): 2 INJECTION INTRAMUSCULAR; INTRAVENOUS; SUBCUTANEOUS at 14:05

## 2022-09-28 NOTE — DISCHARGE NOTE PROVIDER - NSDCFUSCHEDAPPT_GEN_ALL_CORE_FT
[facility]  NEUROLOGY [address] [facility]  Scheduled Appointment: 11/04/2022

## 2022-09-28 NOTE — DISCHARGE NOTE PROVIDER - CARE PROVIDER_API CALL
Alvin Kaplan)  Urology  [address] [place] [address], [address]  [place] [address]  Phone: [phone]  Fax: [phone]  Follow Up Time:

## 2022-09-28 NOTE — DISCHARGE NOTE PROVIDER - NSDCCPTREATMENT_GEN_ALL_CORE_FT
PRINCIPAL PROCEDURE  Procedure: Robotic-assisted partial left nephrectomy  Findings and Treatment:       SECONDARY PROCEDURE  Procedure: Robot-assisted laparoscopic decortication of cyst of kidney  Findings and Treatment:     Procedure: Robotic-assisted partial left nephrectomy  Findings and Treatment:

## 2022-09-28 NOTE — BRIEF OPERATIVE NOTE - OPERATION/FINDINGS
large mass found extruding from superior pole of the left kidney, small simple cyst also seen emanating from inferior pole

## 2022-09-28 NOTE — BRIEF OPERATIVE NOTE - NSICDXBRIEFPROCEDURE_GEN_ALL_CORE_FT
PROCEDURES:  Robot-assisted partial left nephrectomy 28-Sep-2022 13:16:02  Mumtaz Awad  Excision of renal cyst 28-Sep-2022 13:16:35  Mumtaz Awad

## 2022-09-28 NOTE — DISCHARGE NOTE PROVIDER - NSDCFUADDAPPT_GEN_ALL_CORE_FT
Patient can start taking showers on Sunday as instructed by Surgeon   Please continue to perform incentive spirometry 10 times a day   Take Tylenol for pain medication every 4-6 hrs as needed. Patient is prescribed Oxycodone for severe breakthrough pain   Please take stool softeners/laxatives as prescribed   PLEASE make sure to take Eliquis AFTER POST-OP APPOINTMENT with Dr. Kaplan    Follow-up with Dr. Kaplan in 1 week or as scheduled by  office for further management.      Discussed with patient to return to ED, if fever >101.3, persistent N/V, intractable pain despite pain management. All questions asked and answered. Pt understands and verbally agreeable with plan.

## 2022-09-28 NOTE — PROGRESS NOTE ADULT - SUBJECTIVE AND OBJECTIVE BOX
Post op Check  70 y/o male s/p L partial nephrectomy POD #0  Pt seen and examined without complaints. Pain is controlled. Denies SOB/CP/N/V.     Vital Signs Last 24 Hrs  T(C): 37 (28 Sep 2022 12:45), Max: 37 (28 Sep 2022 12:45)  T(F): 98.6 (28 Sep 2022 12:45), Max: 98.6 (28 Sep 2022 12:45)  HR: 69 (28 Sep 2022 14:30) (62 - 75)  BP: 128/67 (28 Sep 2022 14:30) (121/81 - 135/86)  RR: 13 (28 Sep 2022 14:30) (10 - 19)  SpO2: 100% (28 Sep 2022 14:30) (94% - 100%)    Parameters below as of 28 Sep 2022 14:30  Patient On (Oxygen Delivery Method): BiPAP/CPAP  O2 Flow (L/min): 40      I&O's Summary    28 Sep 2022 07:01  -  28 Sep 2022 15:02  --------------------------------------------------------  IN: 250 mL / OUT: 195 mL / NET: 55 mL    Physical Exam  Gen: NAD, A&Ox3  Pulm: No respiratory distress, no subcostal retractions  CV: RRR, no JVD  Abd: Soft, NT, ND, JUSTSU with serosang fluid  Back: No CVAT b/l  : 16f cath draining clear yellow urine                          12.3   17.65 )-----------( 217      ( 28 Sep 2022 13:17 )             38.1       09-28    139  |  103  |  19  ----------------------------<  156<H>  3.8   |  26  |  0.8    Ca    8.1<L>      28 Sep 2022 13:17

## 2022-09-28 NOTE — DISCHARGE NOTE PROVIDER - NSDCCPCAREPLAN_GEN_ALL_CORE_FT
PRINCIPAL DISCHARGE DIAGNOSIS  Diagnosis: Renal mass, left  Assessment and Plan of Treatment:

## 2022-09-28 NOTE — DISCHARGE NOTE PROVIDER - HOSPITAL COURSE
72 y/o male s/p robotic L partial nephrectomy POD #0. EBL 100cc, intraop fluids 2L, UO 250cc. Pt was transferred to recovery in stable condition. Non complicated post op course. post op labs stable. Transferred to floor for overnight observation. Denies any complaints. No fevers, hills, N/V. Tolerating diet and ambulating well. Pain well controlled. Pt is stable and ready for discharge.

## 2022-09-28 NOTE — DISCHARGE NOTE PROVIDER - NSDCMRMEDTOKEN_GEN_ALL_CORE_FT
atorvastatin 80 mg oral tablet: 1 tab(s) orally once a day (at bedtime)  budesonide-formoterol 160 mcg-4.5 mcg/inh inhalation aerosol: 2 puff(s) inhaled 2 times a day  Centrum Silver oral tablet: 1 tab(s) orally once a day  Eliquis 5 mg oral tablet: 1 tab(s) orally 2 times a day   atorvastatin 80 mg oral tablet: 1 tab(s) orally once a day (at bedtime)  atorvastatin 80 mg oral tablet: 1 tab(s) orally once a day (at bedtime)  budesonide-formoterol 160 mcg-4.5 mcg/inh inhalation aerosol: 2 puff(s) inhaled 2 times a day  Centrum Silver oral tablet: 1 tab(s) orally once a day  Colace 100 mg oral capsule: 1 cap(s) orally 3 times a day MDD:3 capsules  Eliquis 5 mg oral tablet: 1 tab(s) orally 2 times a day  oxyCODONE 5 mg oral tablet: 1 tab(s) orally every 6 hours MDD:4 tablets  pantoprazole 40 mg oral delayed release tablet: 1 tab(s) orally once a day (before a meal)  pantoprazole 40 mg oral delayed release tablet: 1 tab(s) orally once a day (before a meal) MDD:1 tablet

## 2022-09-28 NOTE — PROGRESS NOTE ADULT - ASSESSMENT
A/P: 71y Male s/p Robotic L partial nephrectomy POD #0  DVT prophylaxis/OOB  Incentive spirometry  Strict I&O's  Analgesia and antiemetics as needed  Diet  AM labs

## 2022-09-29 ENCOUNTER — TRANSCRIPTION ENCOUNTER (OUTPATIENT)
Age: 72
End: 2022-09-29

## 2022-09-29 VITALS — RESPIRATION RATE: 17 BRPM | HEART RATE: 65 BPM | OXYGEN SATURATION: 95 %

## 2022-09-29 DIAGNOSIS — Z02.9 ENCOUNTER FOR ADMINISTRATIVE EXAMINATIONS, UNSPECIFIED: ICD-10-CM

## 2022-09-29 LAB
ANION GAP SERPL CALC-SCNC: 5 MMOL/L — LOW (ref 7–14)
BASOPHILS # BLD AUTO: 0.01 K/UL — SIGNIFICANT CHANGE UP (ref 0–0.2)
BASOPHILS NFR BLD AUTO: 0.1 % — SIGNIFICANT CHANGE UP (ref 0–1)
BUN SERPL-MCNC: 11 MG/DL — SIGNIFICANT CHANGE UP (ref 10–20)
CALCIUM SERPL-MCNC: 6.7 MG/DL — LOW (ref 8.4–10.5)
CHLORIDE SERPL-SCNC: 110 MMOL/L — SIGNIFICANT CHANGE UP (ref 98–110)
CO2 SERPL-SCNC: 25 MMOL/L — SIGNIFICANT CHANGE UP (ref 17–32)
CREAT SERPL-MCNC: 0.6 MG/DL — LOW (ref 0.7–1.5)
CREAT SERPL-MCNC: 0.7 MG/DL — SIGNIFICANT CHANGE UP (ref 0.7–1.5)
EGFR: 100 ML/MIN/1.73M2 — SIGNIFICANT CHANGE UP
EGFR: 104 ML/MIN/1.73M2 — SIGNIFICANT CHANGE UP
EOSINOPHIL # BLD AUTO: 0.01 K/UL — SIGNIFICANT CHANGE UP (ref 0–0.7)
EOSINOPHIL NFR BLD AUTO: 0.1 % — SIGNIFICANT CHANGE UP (ref 0–8)
GLUCOSE SERPL-MCNC: 101 MG/DL — HIGH (ref 70–99)
HCT VFR BLD CALC: 29.2 % — LOW (ref 42–52)
HCT VFR BLD CALC: 35.5 % — LOW (ref 42–52)
HGB BLD-MCNC: 11.5 G/DL — LOW (ref 14–18)
HGB BLD-MCNC: 9.4 G/DL — LOW (ref 14–18)
IMM GRANULOCYTES NFR BLD AUTO: 0.7 % — HIGH (ref 0.1–0.3)
LYMPHOCYTES # BLD AUTO: 1.08 K/UL — LOW (ref 1.2–3.4)
LYMPHOCYTES # BLD AUTO: 11.7 % — LOW (ref 20.5–51.1)
MCHC RBC-ENTMCNC: 27.7 PG — SIGNIFICANT CHANGE UP (ref 27–31)
MCHC RBC-ENTMCNC: 28 PG — SIGNIFICANT CHANGE UP (ref 27–31)
MCHC RBC-ENTMCNC: 32.2 G/DL — SIGNIFICANT CHANGE UP (ref 32–37)
MCHC RBC-ENTMCNC: 32.4 G/DL — SIGNIFICANT CHANGE UP (ref 32–37)
MCV RBC AUTO: 86.1 FL — SIGNIFICANT CHANGE UP (ref 80–94)
MCV RBC AUTO: 86.6 FL — SIGNIFICANT CHANGE UP (ref 80–94)
MONOCYTES # BLD AUTO: 1.01 K/UL — HIGH (ref 0.1–0.6)
MONOCYTES NFR BLD AUTO: 10.9 % — HIGH (ref 1.7–9.3)
NEUTROPHILS # BLD AUTO: 7.06 K/UL — HIGH (ref 1.4–6.5)
NEUTROPHILS NFR BLD AUTO: 76.5 % — HIGH (ref 42.2–75.2)
NRBC # BLD: 0 /100 WBCS — SIGNIFICANT CHANGE UP (ref 0–0)
NRBC # BLD: 0 /100 WBCS — SIGNIFICANT CHANGE UP (ref 0–0)
PLATELET # BLD AUTO: 161 K/UL — SIGNIFICANT CHANGE UP (ref 130–400)
PLATELET # BLD AUTO: 165 K/UL — SIGNIFICANT CHANGE UP (ref 130–400)
POTASSIUM SERPL-MCNC: 3.4 MMOL/L — LOW (ref 3.5–5)
POTASSIUM SERPL-SCNC: 3.4 MMOL/L — LOW (ref 3.5–5)
RBC # BLD: 3.39 M/UL — LOW (ref 4.7–6.1)
RBC # BLD: 4.1 M/UL — LOW (ref 4.7–6.1)
RBC # FLD: 15.1 % — HIGH (ref 11.5–14.5)
RBC # FLD: 15.6 % — HIGH (ref 11.5–14.5)
SODIUM SERPL-SCNC: 140 MMOL/L — SIGNIFICANT CHANGE UP (ref 135–146)
WBC # BLD: 9.23 K/UL — SIGNIFICANT CHANGE UP (ref 4.8–10.8)
WBC # BLD: 9.97 K/UL — SIGNIFICANT CHANGE UP (ref 4.8–10.8)
WBC # FLD AUTO: 9.23 K/UL — SIGNIFICANT CHANGE UP (ref 4.8–10.8)
WBC # FLD AUTO: 9.97 K/UL — SIGNIFICANT CHANGE UP (ref 4.8–10.8)

## 2022-09-29 RX ORDER — ATORVASTATIN CALCIUM 80 MG/1
1 TABLET, FILM COATED ORAL
Qty: 0 | Refills: 0 | DISCHARGE
Start: 2022-09-29

## 2022-09-29 RX ORDER — PANTOPRAZOLE SODIUM 20 MG/1
1 TABLET, DELAYED RELEASE ORAL
Qty: 7 | Refills: 0
Start: 2022-09-29 | End: 2022-10-05

## 2022-09-29 RX ORDER — PANTOPRAZOLE SODIUM 20 MG/1
1 TABLET, DELAYED RELEASE ORAL
Qty: 0 | Refills: 0 | DISCHARGE
Start: 2022-09-29

## 2022-09-29 RX ORDER — DOCUSATE SODIUM 100 MG
1 CAPSULE ORAL
Qty: 21 | Refills: 0
Start: 2022-09-29 | End: 2022-10-05

## 2022-09-29 RX ORDER — BUDESONIDE AND FORMOTEROL FUMARATE DIHYDRATE 160; 4.5 UG/1; UG/1
2 AEROSOL RESPIRATORY (INHALATION)
Qty: 0 | Refills: 0 | DISCHARGE
Start: 2022-09-29

## 2022-09-29 RX ORDER — OXYCODONE HYDROCHLORIDE 5 MG/1
1 TABLET ORAL
Qty: 10 | Refills: 0
Start: 2022-09-29 | End: 2022-10-01

## 2022-09-29 RX ADMIN — OXYCODONE HYDROCHLORIDE 5 MILLIGRAM(S): 5 TABLET ORAL at 02:00

## 2022-09-29 RX ADMIN — BUDESONIDE AND FORMOTEROL FUMARATE DIHYDRATE 2 PUFF(S): 160; 4.5 AEROSOL RESPIRATORY (INHALATION) at 08:46

## 2022-09-29 RX ADMIN — Medication 650 MILLIGRAM(S): at 00:25

## 2022-09-29 RX ADMIN — Medication 650 MILLIGRAM(S): at 01:15

## 2022-09-29 RX ADMIN — OXYCODONE HYDROCHLORIDE 5 MILLIGRAM(S): 5 TABLET ORAL at 03:00

## 2022-09-29 NOTE — PROGRESS NOTE ADULT - SUBJECTIVE AND OBJECTIVE BOX
Progress Note POD # 1    Subjective Pt seen and examined at bedside   72 y/o Male s/p robotic assisted left partial nephrectomy. Pt doing well,  no acute events overnight.    [x ] a 10 point review of systems was negative except where noted    Vital signs  T(C): , Max: 37 (09-28-22 @ 12:45)  HR: 64 (09-29-22 @ 07:30)  BP: 107/55 (09-29-22 @ 07:30)  SpO2: 95% (09-29-22 @ 07:30)    Gen NC/AT in NAD  SKIN warm, dry with good turgor  Neck supple, FROM  LUNGS No dyspnea, No accessory muscle use  BACK No CVAT  ABD    Soft non tender, bladder not palpable  JUSTUS draining serosanguinous fluid 120 cc last shift, 280 cc since surgery.   Plasencia draining clear urine      Labs                        9.4    9.23  )-----------( 165      ( 29 Sep 2022 04:55 )             29.2     29 Sep 2022 04:55    140    |  110    |  11     ----------------------------<  101    3.4     |  25     |  0.6      Ca    6.7        29 Sep 2022 04:55                                   Progress Note POD # 1    Subjective Pt seen and examined at bedside   72 y/o Male s/p robotic assisted left partial nephrectomy. Pt doing well,  no acute events overnight.    [x ] a 10 point review of systems was negative except where noted    Vital signs  T(C): , Max: 37 (09-28-22 @ 12:45)  HR: 64 (09-29-22 @ 07:30)  BP: 107/55 (09-29-22 @ 07:30)  SpO2: 95% (09-29-22 @ 07:30)    Gen NC/AT in NAD  SKIN warm, dry with good turgor  Neck supple, FROM  LUNGS No dyspnea, No accessory muscle use  BACK No CVAT  ABD    Soft non tender, bladder not palpable  JUSTUS dressing blood stained, JUSTUS draining serosanguinous fluid 120 cc last shift, 280 cc since surgery.   Plasencia draining clear urine      Labs                        9.4    9.23  )-----------( 165      ( 29 Sep 2022 04:55 )             29.2     29 Sep 2022 04:55    140    |  110    |  11     ----------------------------<  101    3.4     |  25     |  0.6      Ca    6.7        29 Sep 2022 04:55

## 2022-09-29 NOTE — PROGRESS NOTE ADULT - ASSESSMENT
72 y/o Male s/p robotic assisted left partial nephrectomy. Pt doing well,  no acute events overnight.    A) Stable POD # 1    P) repeat CBC  hep lock IV  advance diet  d/c Plasencia  JUSTUS for creatinine  no DVT prophylaxis at this time 2/2 to bleeding risk.  SCD  d/w attending 70 y/o Male s/p robotic assisted left partial nephrectomy. Pt doing well,  no acute events overnight.    A) Stable POD # 1    P) repeat CBC  hep lock IV  advance diet  d/c Plasencia  JUSTUS for creatinine  change dressing prn  no DVT prophylaxis at this time 2/2 to bleeding risk.  SCD  d/w attending

## 2022-09-29 NOTE — DISCHARGE NOTE PROVIDER - HOSPITAL COURSE
70 yo M with Left Renal Mass s/p Left partial nephrectomy, excision of renal cyst on 9/28/22. Patient had an uneventful intra-op course. Patient remained stable in recovery room and was downgraded to the floor. He remained hemodynamically stable. Patient melendez catheter and JUSTUS drain was removed today and pressure dressing applied. Patient is stable for discharge on 9/29/22

## 2022-09-29 NOTE — DISCHARGE NOTE NURSING/CASE MANAGEMENT/SOCIAL WORK - NSDCPEFALRISK_GEN_ALL_CORE
For information on Fall & Injury Prevention, visit: [email or web address] OR  [email or web address] OR  https://www.cdc.gov/steadi/patient.html

## 2022-09-29 NOTE — DISCHARGE NOTE PROVIDER - NSDCFUADDAPPT_GEN_ALL_CORE_FT
Please follow- up with Dr. Kaplan in 1 week. You can start showering/bathing on Sunday. Restart Eliquis after post-op appointment with Dr. Kaplan.   Please use incentive spirometer 10x in 1 hr.   Take stool softener as prescribed   Take Tylenol/Iburprofen q 4-5 hrs as needed to pain control. Patient prescribed oxycodone for severe breakthrough pain as needed   Discussed with patient to return to ED, if fever >101.3, persistent N/V, intractable pain despite pain management. All questions asked and answered. Pt understands and verbally agreeable with plan.

## 2022-09-29 NOTE — DISCHARGE NOTE PROVIDER - CARE PROVIDER_API CALL
Alvin Kaplan)  Urology  [address] [place] [address], [address]  [place] [address]  Phone: [phone]  Fax: [phone]  Established Patient  Follow Up Time: 1 week

## 2022-09-29 NOTE — DISCHARGE NOTE PROVIDER - NSDCFUSCHEDAPPT_GEN_ALL_CORE_FT
[facility]  NEUROLOGY [address] [facility]  Scheduled Appointment: 11/04/2022

## 2022-09-29 NOTE — DISCHARGE NOTE PROVIDER - NSDCMRMEDTOKEN_GEN_ALL_CORE_FT
atorvastatin 80 mg oral tablet: 1 tab(s) orally once a day (at bedtime)  budesonide-formoterol 160 mcg-4.5 mcg/inh inhalation aerosol: 2 puff(s) inhaled 2 times a day  Centrum Silver oral tablet: 1 tab(s) orally once a day  Colace 100 mg oral capsule: 1 cap(s) orally 3 times a day MDD:3 capsules  Eliquis 5 mg oral tablet: 1 tab(s) orally 2 times a day  oxyCODONE 5 mg oral tablet: 1 tab(s) orally every 6 hours MDD:4 tablets

## 2022-09-29 NOTE — DISCHARGE NOTE NURSING/CASE MANAGEMENT/SOCIAL WORK - PATIENT PORTAL LINK FT
You can access the FollowMyHealth Patient Portal offered by [facility] by registering at the following website: [email or web address]. By joining [employer]’s FollowMyHealth portal, you will also be able to view your health information using other applications (apps) compatible with our system.

## 2022-09-29 NOTE — DISCHARGE NOTE PROVIDER - DISCHARGE SERVICE FOR PATIENT
Faxed back completed paperwork by Dr Davila to [phone].    on the discharge service for the patient. I have reviewed and made amendments to the documentation where necessary.

## 2022-09-29 NOTE — DISCHARGE NOTE PROVIDER - NSDCCPTREATMENT_GEN_ALL_CORE_FT
PRINCIPAL PROCEDURE  Procedure: Robot-assisted partial left nephrectomy  Findings and Treatment:       SECONDARY PROCEDURE  Procedure: Excision of renal cyst  Findings and Treatment:

## 2022-09-30 LAB — SURGICAL PATHOLOGY STUDY: SIGNIFICANT CHANGE UP

## 2022-10-04 DIAGNOSIS — C64.2 MALIGNANT NEOPLASM OF LEFT KIDNEY, EXCEPT RENAL PELVIS: ICD-10-CM

## 2022-10-04 DIAGNOSIS — N28.1 CYST OF KIDNEY, ACQUIRED: ICD-10-CM

## 2022-10-05 DIAGNOSIS — Z86.73 PERSONAL HISTORY OF TRANSIENT ISCHEMIC ATTACK (TIA), AND CEREBRAL INFARCTION WITHOUT RESIDUAL DEFICITS: ICD-10-CM

## 2022-10-05 DIAGNOSIS — Z79.01 LONG TERM (CURRENT) USE OF ANTICOAGULANTS: ICD-10-CM

## 2022-10-05 DIAGNOSIS — N28.1 CYST OF KIDNEY, ACQUIRED: ICD-10-CM

## 2022-10-05 DIAGNOSIS — C64.2 MALIGNANT NEOPLASM OF LEFT KIDNEY, EXCEPT RENAL PELVIS: ICD-10-CM

## 2022-10-05 DIAGNOSIS — Z87.891 PERSONAL HISTORY OF NICOTINE DEPENDENCE: ICD-10-CM

## 2022-10-05 DIAGNOSIS — G47.33 OBSTRUCTIVE SLEEP APNEA (ADULT) (PEDIATRIC): ICD-10-CM

## 2022-10-05 DIAGNOSIS — Z99.89 DEPENDENCE ON OTHER ENABLING MACHINES AND DEVICES: ICD-10-CM

## 2022-10-05 DIAGNOSIS — I48.91 UNSPECIFIED ATRIAL FIBRILLATION: ICD-10-CM

## 2022-10-05 DIAGNOSIS — N28.89 OTHER SPECIFIED DISORDERS OF KIDNEY AND URETER: ICD-10-CM

## 2022-10-06 ENCOUNTER — APPOINTMENT (OUTPATIENT)
Dept: UROLOGY | Facility: CLINIC | Age: 72
End: 2022-10-06

## 2022-10-06 VITALS
SYSTOLIC BLOOD PRESSURE: 132 MMHG | TEMPERATURE: 97.6 F | HEIGHT: 71 IN | WEIGHT: 238 LBS | DIASTOLIC BLOOD PRESSURE: 86 MMHG | BODY MASS INDEX: 33.32 KG/M2 | HEART RATE: 69 BPM

## 2022-10-06 DIAGNOSIS — K46.9 UNSPECIFIED ABDOMINAL HERNIA W/OUT OBSTRUCTION OR GANGRENE: ICD-10-CM

## 2022-10-06 DIAGNOSIS — K42.9 UMBILICAL HERNIA W/OUT OBSTRUCTION OR GANGRENE: ICD-10-CM

## 2022-10-06 PROCEDURE — 99214 OFFICE O/P EST MOD 30 MIN: CPT | Mod: 24

## 2022-11-04 ENCOUNTER — APPOINTMENT (OUTPATIENT)
Dept: NEUROLOGY | Facility: CLINIC | Age: 72
End: 2022-11-04

## 2023-01-01 NOTE — DISCHARGE NOTE NURSING/CASE MANAGEMENT/SOCIAL WORK - NSTOBACCONEVERSMOKERY/N_GEN_A
The patient is Stable - Low risk of patient condition declining or worsening    Shift Goals  Clinical Goals: Meet ad abhinav shift minimum  Patient Goals: n/a  Family Goals: Update on POC as contact occurs    Progress made toward(s) clinical / shift goals:    Problem: Knowledge Deficit - NICU  Goal: Family/caregivers will demonstrate understanding of plan of care, disease process/condition, diagnostic tests, medications and unit policies and procedures  Outcome: Progressing  Note: MOB called and updated regarding ad abhinav orders. MOB to bring in car seat and make pedi appointment. Questions and concerns addressed; MOB stating understanding.      Problem: Nutrition / Feeding  Goal: Prior to discharge infant will nipple all feedings within 30 minutes  Outcome: Progressing  Note: Infant ad abhinav with shift minimum of 185mL. Infant nippling 75, 47, and 46mL thus far this shift. Dr. Lucero's preemie nipple and bottle in use. SLP following.        Patient is not progressing towards the following goals:       Yes

## 2023-03-13 ENCOUNTER — EMERGENCY (EMERGENCY)
Facility: HOSPITAL | Age: 73
LOS: 0 days | Discharge: HOME | End: 2023-03-14
Attending: EMERGENCY MEDICINE
Payer: MEDICARE

## 2023-03-13 VITALS
WEIGHT: 247.8 LBS | RESPIRATION RATE: 20 BRPM | HEART RATE: 100 BPM | SYSTOLIC BLOOD PRESSURE: 149 MMHG | DIASTOLIC BLOOD PRESSURE: 84 MMHG | OXYGEN SATURATION: 93 % | TEMPERATURE: 98 F

## 2023-03-13 DIAGNOSIS — Z86.73 PERSONAL HISTORY OF TRANSIENT ISCHEMIC ATTACK (TIA), AND CEREBRAL INFARCTION WITHOUT RESIDUAL DEFICITS: ICD-10-CM

## 2023-03-13 DIAGNOSIS — Z86.718 PERSONAL HISTORY OF OTHER VENOUS THROMBOSIS AND EMBOLISM: ICD-10-CM

## 2023-03-13 DIAGNOSIS — Z20.822 CONTACT WITH AND (SUSPECTED) EXPOSURE TO COVID-19: ICD-10-CM

## 2023-03-13 DIAGNOSIS — Z87.442 PERSONAL HISTORY OF URINARY CALCULI: ICD-10-CM

## 2023-03-13 DIAGNOSIS — Z96.0 PRESENCE OF UROGENITAL IMPLANTS: ICD-10-CM

## 2023-03-13 DIAGNOSIS — Z79.01 LONG TERM (CURRENT) USE OF ANTICOAGULANTS: ICD-10-CM

## 2023-03-13 DIAGNOSIS — Z98.890 OTHER SPECIFIED POSTPROCEDURAL STATES: Chronic | ICD-10-CM

## 2023-03-13 DIAGNOSIS — R20.2 PARESTHESIA OF SKIN: ICD-10-CM

## 2023-03-13 DIAGNOSIS — I48.91 UNSPECIFIED ATRIAL FIBRILLATION: ICD-10-CM

## 2023-03-13 DIAGNOSIS — G47.33 OBSTRUCTIVE SLEEP APNEA (ADULT) (PEDIATRIC): ICD-10-CM

## 2023-03-13 DIAGNOSIS — R20.0 ANESTHESIA OF SKIN: ICD-10-CM

## 2023-03-13 LAB
ALBUMIN SERPL ELPH-MCNC: 3.4 G/DL — LOW (ref 3.5–5.2)
ALP SERPL-CCNC: 98 U/L — SIGNIFICANT CHANGE UP (ref 30–115)
ALT FLD-CCNC: 20 U/L — SIGNIFICANT CHANGE UP (ref 0–41)
ANION GAP SERPL CALC-SCNC: 9 MMOL/L — SIGNIFICANT CHANGE UP (ref 7–14)
APTT BLD: 33.1 SEC — SIGNIFICANT CHANGE UP (ref 27–39.2)
AST SERPL-CCNC: 20 U/L — SIGNIFICANT CHANGE UP (ref 0–41)
BASOPHILS # BLD AUTO: 0.03 K/UL — SIGNIFICANT CHANGE UP (ref 0–0.2)
BASOPHILS NFR BLD AUTO: 0.3 % — SIGNIFICANT CHANGE UP (ref 0–1)
BILIRUB SERPL-MCNC: 0.4 MG/DL — SIGNIFICANT CHANGE UP (ref 0.2–1.2)
BUN SERPL-MCNC: 22 MG/DL — HIGH (ref 10–20)
CALCIUM SERPL-MCNC: 8.1 MG/DL — LOW (ref 8.4–10.5)
CHLORIDE SERPL-SCNC: 97 MMOL/L — LOW (ref 98–110)
CO2 SERPL-SCNC: 26 MMOL/L — SIGNIFICANT CHANGE UP (ref 17–32)
CREAT SERPL-MCNC: 0.8 MG/DL — SIGNIFICANT CHANGE UP (ref 0.7–1.5)
EGFR: 95 ML/MIN/1.73M2 — SIGNIFICANT CHANGE UP
EOSINOPHIL # BLD AUTO: 0.16 K/UL — SIGNIFICANT CHANGE UP (ref 0–0.7)
EOSINOPHIL NFR BLD AUTO: 1.7 % — SIGNIFICANT CHANGE UP (ref 0–8)
GLUCOSE SERPL-MCNC: 93 MG/DL — SIGNIFICANT CHANGE UP (ref 70–99)
HCT VFR BLD CALC: 36.8 % — LOW (ref 42–52)
HGB BLD-MCNC: 12 G/DL — LOW (ref 14–18)
IMM GRANULOCYTES NFR BLD AUTO: 0.3 % — SIGNIFICANT CHANGE UP (ref 0.1–0.3)
INR BLD: 1.38 RATIO — HIGH (ref 0.65–1.3)
LYMPHOCYTES # BLD AUTO: 1.8 K/UL — SIGNIFICANT CHANGE UP (ref 1.2–3.4)
LYMPHOCYTES # BLD AUTO: 19.5 % — LOW (ref 20.5–51.1)
MCHC RBC-ENTMCNC: 28.4 PG — SIGNIFICANT CHANGE UP (ref 27–31)
MCHC RBC-ENTMCNC: 32.6 G/DL — SIGNIFICANT CHANGE UP (ref 32–37)
MCV RBC AUTO: 87 FL — SIGNIFICANT CHANGE UP (ref 80–94)
MONOCYTES # BLD AUTO: 0.93 K/UL — HIGH (ref 0.1–0.6)
MONOCYTES NFR BLD AUTO: 10.1 % — HIGH (ref 1.7–9.3)
NEUTROPHILS # BLD AUTO: 6.29 K/UL — SIGNIFICANT CHANGE UP (ref 1.4–6.5)
NEUTROPHILS NFR BLD AUTO: 68.1 % — SIGNIFICANT CHANGE UP (ref 42.2–75.2)
NRBC # BLD: 0 /100 WBCS — SIGNIFICANT CHANGE UP (ref 0–0)
PLATELET # BLD AUTO: 213 K/UL — SIGNIFICANT CHANGE UP (ref 130–400)
POTASSIUM SERPL-MCNC: 3.8 MMOL/L — SIGNIFICANT CHANGE UP (ref 3.5–5)
POTASSIUM SERPL-SCNC: 3.8 MMOL/L — SIGNIFICANT CHANGE UP (ref 3.5–5)
PROT SERPL-MCNC: 5.8 G/DL — LOW (ref 6–8)
PROTHROM AB SERPL-ACNC: 15.9 SEC — HIGH (ref 9.95–12.87)
RBC # BLD: 4.23 M/UL — LOW (ref 4.7–6.1)
RBC # FLD: 15.4 % — HIGH (ref 11.5–14.5)
SODIUM SERPL-SCNC: 132 MMOL/L — LOW (ref 135–146)
TROPONIN T SERPL-MCNC: <0.01 NG/ML — SIGNIFICANT CHANGE UP
WBC # BLD: 9.24 K/UL — SIGNIFICANT CHANGE UP (ref 4.8–10.8)
WBC # FLD AUTO: 9.24 K/UL — SIGNIFICANT CHANGE UP (ref 4.8–10.8)

## 2023-03-13 PROCEDURE — 70450 CT HEAD/BRAIN W/O DYE: CPT | Mod: 26,MA,59

## 2023-03-13 PROCEDURE — 0042T: CPT | Mod: MA

## 2023-03-13 PROCEDURE — 85025 COMPLETE CBC W/AUTO DIFF WBC: CPT

## 2023-03-13 PROCEDURE — 99285 EMERGENCY DEPT VISIT HI MDM: CPT | Mod: 25

## 2023-03-13 PROCEDURE — 70496 CT ANGIOGRAPHY HEAD: CPT | Mod: 26,MA

## 2023-03-13 PROCEDURE — 87635 SARS-COV-2 COVID-19 AMP PRB: CPT

## 2023-03-13 PROCEDURE — 70498 CT ANGIOGRAPHY NECK: CPT | Mod: MA

## 2023-03-13 PROCEDURE — 70450 CT HEAD/BRAIN W/O DYE: CPT | Mod: MA

## 2023-03-13 PROCEDURE — 70496 CT ANGIOGRAPHY HEAD: CPT | Mod: MA

## 2023-03-13 PROCEDURE — 85730 THROMBOPLASTIN TIME PARTIAL: CPT

## 2023-03-13 PROCEDURE — 84484 ASSAY OF TROPONIN QUANT: CPT

## 2023-03-13 PROCEDURE — 82962 GLUCOSE BLOOD TEST: CPT

## 2023-03-13 PROCEDURE — 80053 COMPREHEN METABOLIC PANEL: CPT

## 2023-03-13 PROCEDURE — 93005 ELECTROCARDIOGRAM TRACING: CPT

## 2023-03-13 PROCEDURE — 70498 CT ANGIOGRAPHY NECK: CPT | Mod: 26,MA

## 2023-03-13 PROCEDURE — 71045 X-RAY EXAM CHEST 1 VIEW: CPT

## 2023-03-13 PROCEDURE — 93010 ELECTROCARDIOGRAM REPORT: CPT

## 2023-03-13 PROCEDURE — 36415 COLL VENOUS BLD VENIPUNCTURE: CPT

## 2023-03-13 PROCEDURE — 99285 EMERGENCY DEPT VISIT HI MDM: CPT

## 2023-03-13 PROCEDURE — 71045 X-RAY EXAM CHEST 1 VIEW: CPT | Mod: 26

## 2023-03-13 PROCEDURE — 85610 PROTHROMBIN TIME: CPT

## 2023-03-14 ENCOUNTER — APPOINTMENT (OUTPATIENT)
Dept: CARDIOLOGY | Facility: CLINIC | Age: 73
End: 2023-03-14
Payer: MEDICARE

## 2023-03-14 VITALS
RESPIRATION RATE: 18 BRPM | OXYGEN SATURATION: 97 % | HEART RATE: 66 BPM | DIASTOLIC BLOOD PRESSURE: 82 MMHG | SYSTOLIC BLOOD PRESSURE: 145 MMHG

## 2023-03-14 VITALS
BODY MASS INDEX: 33.6 KG/M2 | SYSTOLIC BLOOD PRESSURE: 90 MMHG | HEIGHT: 71 IN | WEIGHT: 240 LBS | HEART RATE: 58 BPM | RESPIRATION RATE: 16 BRPM | DIASTOLIC BLOOD PRESSURE: 60 MMHG | TEMPERATURE: 97.1 F

## 2023-03-14 LAB
SARS-COV-2 RNA SPEC QL NAA+PROBE: SIGNIFICANT CHANGE UP
TROPONIN T SERPL-MCNC: <0.01 NG/ML — SIGNIFICANT CHANGE UP

## 2023-03-14 PROCEDURE — 99214 OFFICE O/P EST MOD 30 MIN: CPT | Mod: 25

## 2023-03-14 PROCEDURE — 93000 ELECTROCARDIOGRAM COMPLETE: CPT

## 2023-03-14 RX ORDER — ENOXAPARIN SODIUM 100 MG/ML
100 INJECTION, SOLUTION SUBCUTANEOUS TWICE DAILY
Qty: 1 | Refills: 0 | Status: COMPLETED | COMMUNITY
Start: 2022-05-31 | End: 2023-03-14

## 2023-03-14 RX ORDER — AMOXICILLIN AND CLAVULANATE POTASSIUM 875; 125 MG/1; MG/1
875-125 TABLET, COATED ORAL
Qty: 14 | Refills: 0 | Status: COMPLETED | COMMUNITY
Start: 2022-09-12 | End: 2023-03-14

## 2023-03-14 RX ORDER — IPRATROPIUM BROMIDE AND ALBUTEROL SULFATE 2.5; .5 MG/3ML; MG/3ML
0.5-2.5 (3) SOLUTION RESPIRATORY (INHALATION)
Qty: 90 | Refills: 0 | Status: COMPLETED | COMMUNITY
Start: 2022-04-18 | End: 2023-03-14

## 2023-03-14 RX ORDER — AZITHROMYCIN 250 MG/1
250 TABLET, FILM COATED ORAL
Qty: 18 | Refills: 0 | Status: COMPLETED | COMMUNITY
Start: 2022-04-27 | End: 2023-03-14

## 2023-03-14 RX ORDER — APIXABAN 5 MG (74)
5 KIT ORAL
Qty: 74 | Refills: 0 | Status: COMPLETED | COMMUNITY
Start: 2022-04-18 | End: 2023-03-14

## 2023-03-14 RX ORDER — PHENAZOPYRIDINE HYDROCHLORIDE 100 MG/1
100 TABLET ORAL
Qty: 4 | Refills: 0 | Status: COMPLETED | COMMUNITY
Start: 2022-07-27 | End: 2023-03-14

## 2023-03-14 RX ORDER — KETOROLAC TROMETHAMINE 10 MG/1
10 TABLET, FILM COATED ORAL
Qty: 3 | Refills: 0 | Status: COMPLETED | COMMUNITY
Start: 2022-05-08 | End: 2023-03-14

## 2023-03-14 NOTE — ED PROVIDER NOTE - ATTENDING APP SHARED VISIT CONTRIBUTION OF CARE
I have personally performed a history and physical exam on this patient and personally directed the management of the patient. Patient is a 72-year-old male past medical history of CVA actually status post thrombectomy approximately 1 year ago presents for evaluation of numbness to the left forearm and hands starting approximately 30 minutes prior to evaluation patient denies any other symptoms denies any headache visual changes chest pain shortness of breath abdominal pain back pain other extremity weakness fevers chills vomiting difficulty with ambulation patient was able to drive to the emergency department    On physical exam overall patient well-appearing normocephalic atraumatic pupils equal round reactive light accommodation extraocular muscles intact oropharynx clear to auscultation bilaterally abdomen soft nontender nondistended bowel sounds positive no guarding no rebound extremities full range of motion    Neurological exam patient is ANO x3 clear speech pattern strength is 5 out of 5 sensation 5 out of 5 patient has intact sensation to the left upper extremity despite the chief complaint    Assessment plan patient presents for evaluation of numbness and tingling to the left upperr extremity distal forearm which is improved at this time NIH stroke scale overall is 0 nevertheless given his past medical history and complaints we initiated stroke code upon arrival discussed case with [email or web address] at at 10:13 PM in conjunction with neurology patient was deemed not a tPA candidate TNK candidate based on the fact that he has taken p.o. Eliquis within 3 hours of symptom onset in addition and a stroke scale of 0 nevertheless we proceeded to obtain CT head and CTA head and neck and perfusion studies patient found to have chronic CVA no acute findings noted this was communicated to telestroke who deemed patient not a candidate for neuro intervention at this time in addition we obtain EKG which per my attending evaluation is not consistent with STEMI we obtained a troponin which is negative I repeated a second set of troponin which is negative patient was read PE exam at 1:30 AM and now asymptomatic no symptoms I discussed results with the patient discussed the recommendations from telestroke service and neurology with patient advised admission however patient refuses at this time saying that he is asymptomatic in addition provides the fact that he subsequently has a follow-up appoint with cardiology in the a.m. patient is aware of the risk of leaving we have had shared decision making conversation he is well aware of the fact that we recommend admission at this point but does not agree to stay he understands that this may be not in his best interest however wants to leave at this time we have instructed him to follow-up tomorrow as well as have a low threshold for return for reevaluation I have personally performed a history and physical exam on this patient and personally directed the management of the patient. Patient is a 72-year-old male past medical history of CVA actually status post thrombectomy approximately 1 year ago presents for evaluation of numbness to the left forearm and hands starting approximately 30 minutes prior to evaluation patient denies any other symptoms denies any headache visual changes chest pain shortness of breath abdominal pain back pain other extremity weakness fevers chills vomiting difficulty with ambulation patient was able to drive to the emergency department    On physical exam overall patient well-appearing normocephalic atraumatic pupils equal round reactive light accommodation extraocular muscles intact oropharynx clear to auscultation bilaterally abdomen soft nontender nondistended bowel sounds positive no guarding no rebound extremities full range of motion    Neurological exam patient is ANO x3 clear speech pattern strength is 5 out of 5 sensation 5 out of 5 patient has intact sensation to the left upper extremity despite the chief complaint    Assessment plan patient presents for evaluation of numbness and tingling to the left upperr extremity distal forearm which is improved at this time NIH stroke scale overall is 0 nevertheless given his past medical history and complaints we initiated stroke code upon arrival discussed case with [email or web address] at at 10:13 PM in conjunction with neurology patient was deemed not a tPA candidate TNK candidate based on the fact that he has taken p.o. Eliquis within 3 hours of symptom onset in addition and a stroke scale of 0 nevertheless we proceeded to obtain CT head and CTA head and neck and perfusion studies patient found to have chronic CVA no acute findings noted this was communicated to telestroke who deemed patient not a candidate for neuro intervention at this time in addition we obtain EKG which per my attending evaluation is not consistent with STEMI we obtained a troponin which is negative I repeated a second set of troponin which is negative patient was read PE exam at 1:30 AM and now asymptomatic no symptoms I discussed results with the patient discussed the recommendations from telestroke service and neurology with patient advised admission however patient refuses at this time saying that he is asymptomatic in addition provides the fact that he subsequently has a follow-up appoint with cardiology in the a.m. patient is aware of the risk of leaving we have had shared decision making conversation he is well aware of the fact that we recommend admission at this point but does not agree to stay he understands that this may be not in his best interest however wants to leave at this time we have instructed him to follow-up tomorrow as well as have a low threshold for return for reevaluation.

## 2023-03-14 NOTE — PHYSICAL EXAM

## 2023-03-14 NOTE — ED PROVIDER NOTE - PATIENT PORTAL LINK FT
You can access the FollowMyHealth Patient Portal offered by [facility] by registering at the following website: [email or web address]. By joining [employer]’s FollowMyHealth portal, you will also be able to view your health information using other applications (apps) compatible with our system.

## 2023-03-14 NOTE — ASSESSMENT
[FreeTextEntry1] : ET over 4 METs\par \par No chest pain.\par Remains in NSR\par \par \par bradycardia\par - likely related related to VICKI and SSS\par - no indication for PPM at this time as per EP - f/u as scheduled.\par \par \par AF\par - cont Eliquis 5 mg q12; no bleeding\par - Rational for CVA prevention in PAF discussed.\par \par - Hoarse voice. Recommend ENT eval.\par \par F/u with neuro\par \par - Patient was advised about healthy lifestyle changes, including diet and exercise. Importance of sustained long-term weight loss was discussed, questions answered.\par \par F/u in 6-12 months\par \par \par

## 2023-03-14 NOTE — ED PROVIDER NOTE - CLINICAL SUMMARY MEDICAL DECISION MAKING FREE TEXT BOX
patient presents for evaluation of numbness and tingling to the left upperr extremity distal forearm which is improved at this time NIH stroke scale overall is 0 nevertheless given his past medical history and complaints we initiated stroke code upon arrival discussed case with telestroke at at 10:13 PM in conjunction with neurology patient was deemed not a tPA candidate TNK candidate based on the fact that he has taken p.o. Eliquis within 3 hours of symptom onset in addition and a stroke scale of 0 nevertheless we proceeded to obtain CT head and CTA head and neck and perfusion studies patient found to have chronic CVA no acute findings noted this was communicated to telestroke who deemed patient not a candidate for neuro intervention at this time in addition we obtain EKG which per my attending evaluation is not consistent with STEMI we obtained a troponin which is negative I repeated a second set of troponin which is negative patient was read PE exam at 1:30 AM and now asymptomatic no symptoms I discussed results with the patient discussed the recommendations from telestroke service and neurology with patient advised admission however patient refuses at this time saying that he is asymptomatic in addition provides the fact that he subsequently has a follow-up appoint with cardiology in the a.m. patient is aware of the risk of leaving we have had shared decision making conversation he is well aware of the fact that we recommend admission at this point but does not agree to stay he understands that this may be not in his best interest however wants to leave at this time we have instructed him to follow-up tomorrow as well as have a low threshold for return for reevaluation

## 2023-03-14 NOTE — HISTORY OF PRESENT ILLNESS
[FreeTextEntry1] : 73 y/o M with PMHx of COVID infection, COPD,  VICKI on CPAP, HCV+, with recent admission 4/14-4/18s for an acute ischemic stroke of left temporal lobe, s/p T ICI 3 recanalization of the inferior division of M2 on the left  side from thrombotic occlusion 4/14, cardioembolic 2/2 new onset Afib, and also  with RLE DVT (currently on Eliquis 5 mg BID). Tolerating AC\par \par Patient found to have nocturnal bradycardia, which was felt to be related to VICKI, and no PPM was required. He is rate controlled. Was in ER last night due to numbness of the left arm - was evaluated for CVA, w/u negative. No chest pain, dyspnea or palpitations. CT noted.\par \par EKG AF 82 bpm\par Echo - EF 64%; no valvular abnormalities \par EM - AF 84% burden, HR ; Night pauses

## 2023-03-14 NOTE — REVIEW OF SYSTEMS
[Feeling Fatigued] : feeling fatigued [Urinary Frequency] : urinary frequency [Joint Pain] : joint pain [Negative] : Heme/Lymph [FreeTextEntry5] : see HPI [FreeTextEntry6] : see HPI [FreeTextEntry8] : see HPI [de-identified] : see HPI

## 2023-03-14 NOTE — ED PROVIDER NOTE - NSSTROKETPAEXCLABS_THROMBIN3
---  If oral direct thrombin inhibitor (e.g. dabigatran) or oral direct factor Xa inhibitors (e.g. apixaban, rivaroxaban) have been taken within 3 hours of presentation, thrombolytic therapy is contraindicated regardless of coagulation study results

## 2023-04-11 ENCOUNTER — APPOINTMENT (OUTPATIENT)
Dept: UROLOGY | Facility: CLINIC | Age: 73
End: 2023-04-11

## 2023-05-23 ENCOUNTER — APPOINTMENT (OUTPATIENT)
Dept: UROLOGY | Facility: CLINIC | Age: 73
End: 2023-05-23
Payer: MEDICARE

## 2023-05-23 DIAGNOSIS — Z87.442 PERSONAL HISTORY OF URINARY CALCULI: ICD-10-CM

## 2023-05-23 DIAGNOSIS — C64.9 MALIGNANT NEOPLASM OF UNSPECIFIED KIDNEY, EXCEPT RENAL PELVIS: ICD-10-CM

## 2023-05-23 PROCEDURE — 99214 OFFICE O/P EST MOD 30 MIN: CPT

## 2023-05-23 NOTE — HISTORY OF PRESENT ILLNESS
[FreeTextEntry1] : MELL SANTILLAN is a 71 year old male hx of COPD, AFIB on Eliquis with a CVA on 04/2022 s/p URS/LL stent insertion for right ureteral stone 05/2022 who presents for consultation for left renal mass identified incidentally during this episode of obstructing right ureteral stone. Persistent microhematuria.  Negative hematuria work-up.  Status post left robotic partial nephrectomy on 9/20/2022, pathology T1b clear cell RCC G2\par \par Patient doing well denies any hematuria or flank pain\par \par CT chest with IV contrast, from 4/25/2023, demonstrates no evidence of metastatic disease.  4.7 cm aneurysmal dilatation of the ascending thoracic aorta, mildly progressive prior.\par \par CT abdomen pelvis with IV contrast, from 4/25/2023, demonstrates no evidence for local regional recurrence or metastatic disease.  Status post left partial nephrectomy.  Consider pre-/post MRI of abdomen to avoid ionizing radiation.  Images visualized by me there is no suspicion for recurrence.  Kidney appears healthy\par \par Previous:\par MRI abdomen pelvis images 06/29/22 : \par Partially necrotic enhancing well solid mass measuring 4.6 cm posteriorly at the upper pole , the left kidney consistent with renal neoplasm .There is an exophytic non enhancing cyst LLP 2.3 cm \par \par CT abdomen pelvis with IV contrast (when Pt presented with ureteral stone) 05/22 images visualized \par 4.7 cm LUP renal mass , bilateral fat containing hernia and umbilical hernia . Single renal artery and single renal vein \par \par RBUS images 08/08/22 :\par Fullness of the right renal collecting system.\par Left upper pole renal mass seen on CT of 5/7/2022 is not well evaluated on this sonogram.\par Enlarged prostate 53 ml \par \par July 2022 labs\par creatinine 0.6\par GFR -104 \par U/A - microhematuria x2 \par \par denies other  PMH including previous kidney stones, recurrent UTIs. \par Family History: No  malignancies\par Social History: former smoker\par PSH remote anal surgery for fissure, no abdominal surgey \par

## 2023-05-23 NOTE — PHYSICAL EXAM
[General Appearance - Well Developed] : well developed [General Appearance - Well Nourished] : well nourished [Normal Appearance] : normal appearance [Well Groomed] : well groomed [General Appearance - In No Acute Distress] : no acute distress [Abdomen Soft] : soft [Abdomen Tenderness] : non-tender [Costovertebral Angle Tenderness] : no ~M costovertebral angle tenderness [] : no respiratory distress [Respiration, Rhythm And Depth] : normal respiratory rhythm and effort [Exaggerated Use Of Accessory Muscles For Inspiration] : no accessory muscle use [Normal Station and Gait] : the gait and station were normal for the patient's age [No Focal Deficits] : no focal deficits [FreeTextEntry1] : Umbilical hernia.  Surgical scars intact no evidence of herniation

## 2023-05-23 NOTE — ASSESSMENT
[FreeTextEntry1] : MELL SANTILLAN is a 71 year old male hx of COPD, AFIB on Eliquis with a CVA on 04/2022 s/p URS/LL stent insertion for right ureteral stone 05/2022 who presents for consultation for left renal mass identified incidentally during this episode of obstructing right ureteral stone. Persistent microhematuria.  Negative hematuria work-up.  Status post left robotic partial nephrectomy on 9/20/2022, pathology demonstrates clear cell.  CT demonstrates no evidence of metastatic disease.  CT of chest demonstrates aneurysm.\par \par Plan:\par -6 months MRI\par -Follow-up PCP to discuss thoracic aortic aneurysm\par -Follow-up to review\par Continue stone dietary prevention\par

## 2023-07-18 NOTE — PROGRESS NOTE ADULT - ASSESSMENT
A 70 y/o M with PMHx of COVID (+) for  past 10 days, COPD,  VICKI on CPAP, HLD,  GERD,  HCV+, with recent admission 4/14 --4/18s s/p acute ischemic stroke of left temporal lobe, s/p T ICI 3 recanalization of the inferior division of M2 on the left-hand side from thrombotic occlusion 4/14, cardioembolic 2/2 new onset afib, and also with RLE DVT (currently on Eliquis 5 mg BID) admitted for rt flank pain.       # Obstructing right proximal ureteral stone with hydronephrosis  # renal colic pain   - IV fluids  -pain meds  -NPO after midnight   -urology consult appreciated - possible stent placement tomorrow  -monitor for fever, if develops fever  will need urgent stent placement  -am labs  -neurology and cardiology clearance for OR  -trend lactate at 7 pm   -active type and screen   -coags PT/PTT/INR    #incidental finding  Left renal mass  -oupt follow up with urology for partial vs total nephrectomy     #Multiple new small right lower lobe nodules measuring less than 7 mm may   be infectious/inflammatory and less likely metastatic  -oupt follow up with pulmonary and oncology      #COVID positive  - pt previously had covid recently  -isolation precaution as per infection control    #  RLE DVT  # chronic A fib  -will hold  Eliquis 5 for now   - rate controlled    # sleep apnea  -continue Cpap at night   -monitor pule ox     #HLD  -continue statin     #hx  HCV infection  F/u with GI OP    # chronic  COPD  -continue home medications    #GERD  -continue pepcid       Double Island Pedicle Flap Text: The defect edges were debeveled with a #15 scalpel blade.  Given the location of the defect, shape of the defect and the proximity to free margins a double island pedicle advancement flap was deemed most appropriate.  Using a sterile surgical marker, an appropriate advancement flap was drawn incorporating the defect, outlining the appropriate donor tissue and placing the expected incisions within the relaxed skin tension lines where possible.    The area thus outlined was incised deep to adipose tissue with a #15 scalpel blade.  The skin margins were undermined to an appropriate distance in all directions around the primary defect and laterally outward around the island pedicle utilizing iris scissors.  There was minimal undermining beneath the pedicle flap.

## 2023-08-10 NOTE — ED PROVIDER NOTE - OBJECTIVE STATEMENT
2 (mild pain) Patient is a 72-year-old male past medical history of CVA actually status post thrombectomy approximately 1 year ago presents for evaluation of numbness to the left forearm and hands starting approximately 30 minutes prior to evaluation patient denies any other symptoms denies any headache visual changes chest pain shortness of breath abdominal pain back pain other extremity weakness fevers chills vomiting difficulty with ambulation patient was able to drive to the emergency department

## 2023-09-12 ENCOUNTER — APPOINTMENT (OUTPATIENT)
Dept: CARDIOLOGY | Facility: CLINIC | Age: 73
End: 2023-09-12
Payer: MEDICARE

## 2023-09-12 VITALS
WEIGHT: 246 LBS | SYSTOLIC BLOOD PRESSURE: 116 MMHG | BODY MASS INDEX: 34.44 KG/M2 | HEIGHT: 71 IN | TEMPERATURE: 98 F | HEART RATE: 62 BPM | DIASTOLIC BLOOD PRESSURE: 70 MMHG

## 2023-09-12 DIAGNOSIS — I63.9 CEREBRAL INFARCTION, UNSPECIFIED: ICD-10-CM

## 2023-09-12 PROCEDURE — 99213 OFFICE O/P EST LOW 20 MIN: CPT | Mod: 25

## 2023-09-12 PROCEDURE — 93000 ELECTROCARDIOGRAM COMPLETE: CPT

## 2023-12-21 NOTE — BRIEF OPERATIVE NOTE - COMMENTS
TICI 3 achieved left MCA M2 branch with one pass.  Patient is to be admitted to neuro ICU for post thrombectomy monitoring.   Neuro ICU signout given over spectra and at bedside.
no

## 2024-04-30 ENCOUNTER — APPOINTMENT (OUTPATIENT)
Dept: CARDIOLOGY | Facility: CLINIC | Age: 74
End: 2024-04-30
Payer: MEDICARE

## 2024-04-30 VITALS
TEMPERATURE: 97.1 F | SYSTOLIC BLOOD PRESSURE: 130 MMHG | BODY MASS INDEX: 33.88 KG/M2 | WEIGHT: 242 LBS | HEIGHT: 71 IN | HEART RATE: 59 BPM | DIASTOLIC BLOOD PRESSURE: 80 MMHG

## 2024-04-30 DIAGNOSIS — I48.91 UNSPECIFIED ATRIAL FIBRILLATION: ICD-10-CM

## 2024-04-30 DIAGNOSIS — R00.1 BRADYCARDIA, UNSPECIFIED: ICD-10-CM

## 2024-04-30 DIAGNOSIS — I63.9 CEREBRAL INFARCTION, UNSPECIFIED: ICD-10-CM

## 2024-04-30 PROCEDURE — 93000 ELECTROCARDIOGRAM COMPLETE: CPT

## 2024-04-30 PROCEDURE — G2211 COMPLEX E/M VISIT ADD ON: CPT

## 2024-04-30 PROCEDURE — 99214 OFFICE O/P EST MOD 30 MIN: CPT

## 2024-04-30 RX ORDER — ATORVASTATIN CALCIUM 80 MG/1
80 TABLET, FILM COATED ORAL DAILY
Refills: 0 | Status: ACTIVE | COMMUNITY

## 2024-04-30 RX ORDER — APIXABAN 5 MG/1
5 TABLET, FILM COATED ORAL
Qty: 180 | Refills: 3 | Status: ACTIVE | COMMUNITY
Start: 1900-01-01 | End: 1900-01-01

## 2024-04-30 RX ORDER — FUROSEMIDE 20 MG/1
20 TABLET ORAL DAILY
Qty: 30 | Refills: 3 | Status: ACTIVE | COMMUNITY
Start: 2024-04-30 | End: 1900-01-01

## 2024-04-30 RX ORDER — BUDESONIDE, GLYCOPYRROLATE, AND FORMOTEROL FUMARATE 160; 9; 4.8 UG/1; UG/1; UG/1
160-9-4.8 AEROSOL, METERED RESPIRATORY (INHALATION) TWICE DAILY
Refills: 0 | Status: ACTIVE | COMMUNITY

## 2024-04-30 NOTE — HISTORY OF PRESENT ILLNESS
[FreeTextEntry1] : 72 y/o M with PMHx of COVID infection, COPD,  VICKI on CPAP, HCV+, s/p acute ischemic stroke of left temporal lobe, s/p T ICI 3 recanalization of the inferior division of M2 on the left  side from thrombotic occlusion 4/14/23, cardioembolic 2/2 new onset Afib, and also  with RLE DVT (currently on Eliquis 5 mg BID). Tolerating AC  Patient found to have nocturnal bradycardia, which was felt to be related to VICKI, and no PPM was required. He is rate controlled. No chest pain, dyspnea or palpitations.   EKG AF 82 bpm Echo - EF 64%; no valvular abnormalities  EM - AF 84% burden, HR ; Night pauses

## 2024-04-30 NOTE — ASSESSMENT
[FreeTextEntry1] : ET over 4 METs  No chest pain. PAF, Remains in NSR   bradycardia - likely related related to VICKI and SSS - no indication for PPM at this time as per EP - f/u as scheduled.   AF - cont Eliquis 5 mg q12; no bleeding - Rational for CVA prevention in PAF discussed.   F/u with neuro  - Patient was advised about healthy lifestyle changes, including diet and exercise. Importance of sustained long-term weight loss was discussed, questions answered.  F/u in 6-12 months

## 2024-04-30 NOTE — REVIEW OF SYSTEMS
[Feeling Fatigued] : feeling fatigued [Urinary Frequency] : urinary frequency [Joint Pain] : joint pain [Negative] : Heme/Lymph [FreeTextEntry5] : see HPI [FreeTextEntry6] : see HPI [FreeTextEntry8] : see HPI [de-identified] : see HPI

## 2024-04-30 NOTE — PHYSICAL EXAM

## 2024-11-12 ENCOUNTER — APPOINTMENT (OUTPATIENT)
Dept: CARDIOLOGY | Facility: CLINIC | Age: 74
End: 2024-11-12
Payer: MEDICARE

## 2024-11-12 VITALS
BODY MASS INDEX: 34.3 KG/M2 | WEIGHT: 245 LBS | HEIGHT: 71 IN | TEMPERATURE: 97.1 F | DIASTOLIC BLOOD PRESSURE: 85 MMHG | HEART RATE: 47 BPM | SYSTOLIC BLOOD PRESSURE: 135 MMHG

## 2024-11-12 DIAGNOSIS — I48.91 UNSPECIFIED ATRIAL FIBRILLATION: ICD-10-CM

## 2024-11-12 DIAGNOSIS — R00.1 BRADYCARDIA, UNSPECIFIED: ICD-10-CM

## 2024-11-12 DIAGNOSIS — I63.9 CEREBRAL INFARCTION, UNSPECIFIED: ICD-10-CM

## 2024-11-12 PROCEDURE — 99214 OFFICE O/P EST MOD 30 MIN: CPT

## 2024-11-12 PROCEDURE — 93000 ELECTROCARDIOGRAM COMPLETE: CPT

## 2024-11-12 PROCEDURE — G2211 COMPLEX E/M VISIT ADD ON: CPT

## 2025-05-27 ENCOUNTER — NON-APPOINTMENT (OUTPATIENT)
Age: 75
End: 2025-05-27

## 2025-05-27 DIAGNOSIS — R06.02 SHORTNESS OF BREATH: ICD-10-CM

## 2025-05-27 DIAGNOSIS — R06.83 SNORING: ICD-10-CM

## 2025-05-27 DIAGNOSIS — K21.9 GASTRO-ESOPHAGEAL REFLUX DISEASE W/OUT ESOPHAGITIS: ICD-10-CM

## 2025-05-27 RX ORDER — ALBUTEROL SULFATE 90 UG/1
108 (90 BASE) INHALANT RESPIRATORY (INHALATION)
Refills: 0 | Status: ACTIVE | COMMUNITY

## 2025-05-27 RX ORDER — FAMOTIDINE 40 MG/1
40 TABLET, FILM COATED ORAL
Refills: 0 | Status: ACTIVE | COMMUNITY

## 2025-06-10 ENCOUNTER — APPOINTMENT (OUTPATIENT)
Age: 75
End: 2025-06-10

## 2025-06-10 VITALS
HEART RATE: 73 BPM | HEIGHT: 71 IN | BODY MASS INDEX: 33.88 KG/M2 | TEMPERATURE: 97.4 F | WEIGHT: 242 LBS | SYSTOLIC BLOOD PRESSURE: 134 MMHG | OXYGEN SATURATION: 95 % | RESPIRATION RATE: 14 BRPM | DIASTOLIC BLOOD PRESSURE: 89 MMHG

## 2025-06-10 PROBLEM — Z99.89 CPAP (CONTINUOUS POSITIVE AIRWAY PRESSURE) DEPENDENCE: Status: ACTIVE | Noted: 2025-06-10

## 2025-06-10 PROBLEM — R94.2 RESTRICTIVE VENTILATORY DEFECT: Status: ACTIVE | Noted: 2025-06-10

## 2025-06-10 PROBLEM — J98.4 PULMONARY SCARRING: Status: ACTIVE | Noted: 2025-06-10

## 2025-06-10 PROBLEM — E66.811 CLASS 1 DRUG-INDUCED OBESITY WITHOUT SERIOUS COMORBIDITY WITH BODY MASS INDEX (BMI) OF 33.0 TO 33.9 IN ADULT: Status: ACTIVE | Noted: 2025-06-10

## 2025-06-10 PROBLEM — J92.9 PLEURAL THICKENING: Status: ACTIVE | Noted: 2025-06-10

## 2025-06-10 PROBLEM — G47.33 OSA (OBSTRUCTIVE SLEEP APNEA): Status: ACTIVE | Noted: 2025-06-10

## 2025-06-10 PROBLEM — R05.3 CHRONIC COUGH: Status: ACTIVE | Noted: 2025-06-10

## 2025-06-10 PROBLEM — J44.9 COPD (CHRONIC OBSTRUCTIVE PULMONARY DISEASE): Status: ACTIVE | Noted: 2025-06-10

## 2025-06-10 PROCEDURE — 94727 GAS DIL/WSHOT DETER LNG VOL: CPT

## 2025-06-10 PROCEDURE — 94060 EVALUATION OF WHEEZING: CPT

## 2025-06-10 PROCEDURE — 94729 DIFFUSING CAPACITY: CPT

## 2025-06-10 PROCEDURE — 99204 OFFICE O/P NEW MOD 45 MIN: CPT | Mod: 25

## 2025-06-16 RX ORDER — BUDESONIDE, GLYCOPYRROLATE, AND FORMOTEROL FUMARATE 160; 9; 4.8 UG/1; UG/1; UG/1
160-9-4.8 AEROSOL, METERED RESPIRATORY (INHALATION)
Qty: 3 | Refills: 3 | Status: ACTIVE | COMMUNITY
Start: 2025-06-10 | End: 1900-01-01

## 2025-06-30 RX ORDER — FLUTICASONE FUROATE, UMECLIDINIUM BROMIDE AND VILANTEROL TRIFENATATE 100; 62.5; 25 UG/1; UG/1; UG/1
100-62.5-25 POWDER RESPIRATORY (INHALATION) DAILY
Qty: 1 | Refills: 6 | Status: ACTIVE | COMMUNITY
Start: 2025-06-30 | End: 1900-01-01

## 2025-08-14 ENCOUNTER — RX RENEWAL (OUTPATIENT)
Age: 75
End: 2025-08-14